# Patient Record
Sex: FEMALE | Race: WHITE | NOT HISPANIC OR LATINO | Employment: UNEMPLOYED | URBAN - METROPOLITAN AREA
[De-identification: names, ages, dates, MRNs, and addresses within clinical notes are randomized per-mention and may not be internally consistent; named-entity substitution may affect disease eponyms.]

---

## 2017-02-10 ENCOUNTER — HISTORICAL (OUTPATIENT)
Dept: ADMINISTRATIVE | Facility: HOSPITAL | Age: 37
End: 2017-02-10

## 2017-06-19 ENCOUNTER — HISTORICAL (OUTPATIENT)
Dept: ADMINISTRATIVE | Facility: HOSPITAL | Age: 37
End: 2017-06-19

## 2017-06-21 LAB
FINAL CULTURE: NO GROWTH
FINAL CULTURE: NORMAL

## 2017-06-22 LAB — FINAL CULTURE: NORMAL

## 2017-07-16 ENCOUNTER — HISTORICAL (OUTPATIENT)
Dept: ADMINISTRATIVE | Facility: HOSPITAL | Age: 37
End: 2017-07-16

## 2018-11-08 ENCOUNTER — HISTORICAL (OUTPATIENT)
Dept: ADMINISTRATIVE | Facility: HOSPITAL | Age: 38
End: 2018-11-08

## 2018-11-08 LAB
ABS NEUT (OLG): 6.19 X10(3)/MCL (ref 2.1–9.2)
AMPHET UR QL SCN: NORMAL
ANISOCYTOSIS BLD QL SMEAR: 1
BARBITURATE SCN PRESENT UR: NORMAL
BENZODIAZ UR QL SCN: NORMAL
CANNABINOIDS UR QL SCN: NORMAL
COCAINE UR QL SCN: NORMAL
EOSINOPHIL NFR BLD MANUAL: 1 % (ref 0–8)
ERYTHROCYTE [DISTWIDTH] IN BLOOD BY AUTOMATED COUNT: 12.7 % (ref 11.5–17)
GROUP & RH: NORMAL
HCT VFR BLD AUTO: 34.7 % (ref 37–47)
HGB BLD-MCNC: 12 GM/DL (ref 12–16)
LYMPHOCYTES NFR BLD MANUAL: 24 % (ref 13–40)
MCH RBC QN AUTO: 33.6 PG (ref 27–31)
MCHC RBC AUTO-ENTMCNC: 34.6 GM/DL (ref 33–36)
MCV RBC AUTO: 97.2 FL (ref 80–94)
MONOCYTES NFR BLD MANUAL: 7 % (ref 2–11)
NEUTROPHILS NFR BLD MANUAL: 68 % (ref 47–80)
OPIATES UR QL SCN: NORMAL
PCP UR QL: NORMAL
PH UR STRIP.AUTO: 7 [PH] (ref 5–7.5)
PLATELET # BLD AUTO: 155 X10(3)/MCL (ref 130–400)
PLATELET # BLD EST: NORMAL 10*3/UL
PMV BLD AUTO: 11.5 FL (ref 7.4–10.4)
RBC # BLD AUTO: 3.57 X10(6)/MCL (ref 4.2–5.4)
SP GR FLD REFRACTOMETRY: 1.02 (ref 1–1.03)
T PALLIDUM AB SER QL: NORMAL
WBC # SPEC AUTO: 9.9 X10(3)/MCL (ref 4.5–11.5)

## 2018-11-09 LAB
ABS NEUT (OLG): 8.77 X10(3)/MCL (ref 2.1–9.2)
BASOPHILS # BLD AUTO: 0 X10(3)/MCL (ref 0–0.2)
BASOPHILS NFR BLD AUTO: 0 %
BUN SERPL-MCNC: 6 MG/DL (ref 7–18)
CALCIUM SERPL-MCNC: 8.3 MG/DL (ref 8.5–10.1)
CHLORIDE SERPL-SCNC: 104 MMOL/L (ref 98–107)
CO2 SERPL-SCNC: 25 MMOL/L (ref 21–32)
CREAT SERPL-MCNC: 0.43 MG/DL (ref 0.55–1.02)
CREAT/UREA NIT SERPL: 14
EOSINOPHIL # BLD AUTO: 0.2 X10(3)/MCL (ref 0–0.9)
EOSINOPHIL NFR BLD AUTO: 1 %
ERYTHROCYTE [DISTWIDTH] IN BLOOD BY AUTOMATED COUNT: 12.8 % (ref 11.5–17)
GLUCOSE SERPL-MCNC: 78 MG/DL (ref 74–106)
HCT VFR BLD AUTO: 31.9 % (ref 37–47)
HGB BLD-MCNC: 10.8 GM/DL (ref 12–16)
LYMPHOCYTES # BLD AUTO: 2.4 X10(3)/MCL (ref 0.6–4.6)
LYMPHOCYTES NFR BLD AUTO: 20 %
MCH RBC QN AUTO: 34 PG (ref 27–31)
MCHC RBC AUTO-ENTMCNC: 33.9 GM/DL (ref 33–36)
MCV RBC AUTO: 100.3 FL (ref 80–94)
MONOCYTES # BLD AUTO: 0.9 X10(3)/MCL (ref 0.1–1.3)
MONOCYTES NFR BLD AUTO: 7 %
NEUTROPHILS # BLD AUTO: 8.77 X10(3)/MCL (ref 2.1–9.2)
NEUTROPHILS NFR BLD AUTO: 71 %
PLATELET # BLD AUTO: 141 X10(3)/MCL (ref 130–400)
PMV BLD AUTO: 11.5 FL (ref 9.4–12.4)
POTASSIUM SERPL-SCNC: 3.9 MMOL/L (ref 3.5–5.1)
RBC # BLD AUTO: 3.18 X10(6)/MCL (ref 4.2–5.4)
SODIUM SERPL-SCNC: 138 MMOL/L (ref 136–145)
WBC # SPEC AUTO: 12.4 X10(3)/MCL (ref 4.5–11.5)

## 2018-11-10 LAB — CA-I BLD-SCNC: 1.15 MMOL/L (ref 1.12–1.23)

## 2019-01-06 ENCOUNTER — HISTORICAL (OUTPATIENT)
Dept: ADMINISTRATIVE | Facility: HOSPITAL | Age: 39
End: 2019-01-06

## 2019-01-06 LAB
ABS NEUT (OLG): 7.6 X10(3)/MCL (ref 1.5–6.9)
ALBUMIN SERPL-MCNC: 3.6 GM/DL (ref 3.4–5)
ALBUMIN/GLOB SERPL: 1 RATIO
ALP SERPL-CCNC: 59 UNIT/L (ref 30–113)
ALT SERPL-CCNC: 17 UNIT/L (ref 10–45)
APPEARANCE, UA: ABNORMAL
AST SERPL-CCNC: 15 UNIT/L (ref 15–37)
BACTERIA SPEC CULT: ABNORMAL /HPF
BASOPHILS # BLD AUTO: 0 X10(3)/MCL (ref 0–0.1)
BASOPHILS NFR BLD AUTO: 0 % (ref 0–1)
BILIRUB SERPL-MCNC: 0.5 MG/DL (ref 0.1–0.9)
BILIRUB UR QL STRIP: NEGATIVE
BILIRUBIN DIRECT+TOT PNL SERPL-MCNC: 0.1 MG/DL (ref 0–0.3)
BILIRUBIN DIRECT+TOT PNL SERPL-MCNC: 0.4 MG/DL
BUN SERPL-MCNC: 11 MG/DL (ref 10–20)
CALCIUM SERPL-MCNC: 8.8 MG/DL (ref 8–10.5)
CHLORIDE SERPL-SCNC: 103 MMOL/L (ref 100–108)
CO2 SERPL-SCNC: 27 MMOL/L (ref 21–35)
COLOR UR: ABNORMAL
CREAT SERPL-MCNC: 0.81 MG/DL (ref 0.7–1.3)
EOSINOPHIL # BLD AUTO: 0.1 X10(3)/MCL (ref 0–0.6)
EOSINOPHIL NFR BLD AUTO: 1 % (ref 0–5)
ERYTHROCYTE [DISTWIDTH] IN BLOOD BY AUTOMATED COUNT: 10.8 % (ref 11.5–17)
GLOBULIN SER-MCNC: 3.6 GM/DL
GLUCOSE (UA): NEGATIVE
GLUCOSE SERPL-MCNC: 121 MG/DL (ref 75–116)
HCT VFR BLD AUTO: 40.1 % (ref 36–48)
HGB BLD-MCNC: 13.7 GM/DL (ref 12–16)
HGB UR QL STRIP: ABNORMAL
IMM GRANULOCYTES # BLD AUTO: 0.07 10*3/UL (ref 0–0.02)
IMM GRANULOCYTES NFR BLD AUTO: 0.7 % (ref 0–0.43)
KETONES UR QL STRIP: NEGATIVE
LEUKOCYTE ESTERASE UR QL STRIP: NEGATIVE
LYMPHOCYTES # BLD AUTO: 2.1 X10(3)/MCL (ref 0.5–4.1)
LYMPHOCYTES NFR BLD AUTO: 20 % (ref 15–40)
MCH RBC QN AUTO: 31 PG (ref 27–34)
MCHC RBC AUTO-ENTMCNC: 34 GM/DL (ref 31–36)
MCV RBC AUTO: 92 FL (ref 80–99)
MONOCYTES # BLD AUTO: 0.5 X10(3)/MCL (ref 0–1.1)
MONOCYTES NFR BLD AUTO: 4 % (ref 4–12)
NEUTROPHILS # BLD AUTO: 7.6 X10(3)/MCL (ref 1.5–6.9)
NEUTROPHILS NFR BLD AUTO: 73 % (ref 43–75)
NITRITE UR QL STRIP: NEGATIVE
PH UR STRIP: 6 [PH]
PLATELET # BLD AUTO: 198 X10(3)/MCL (ref 140–400)
PMV BLD AUTO: 11.3 FL (ref 6.8–10)
POTASSIUM SERPL-SCNC: 3.6 MMOL/L (ref 3.6–5.2)
PROT SERPL-MCNC: 7.2 GM/DL (ref 6.4–8.2)
PROT UR QL STRIP: NEGATIVE
RBC # BLD AUTO: 4.37 X10(6)/MCL (ref 4.2–5.4)
RBC #/AREA URNS HPF: ABNORMAL /HPF
SODIUM SERPL-SCNC: 139 MMOL/L (ref 135–145)
SP GR UR STRIP: 1.01
SQUAMOUS EPITHELIAL, UA: ABNORMAL /LPF
UROBILINOGEN UR STRIP-ACNC: 0.2 EU/DL
WBC # SPEC AUTO: 10.3 X10(3)/MCL (ref 4.5–11.5)
WBC #/AREA URNS HPF: ABNORMAL /HPF

## 2019-01-10 ENCOUNTER — HISTORICAL (OUTPATIENT)
Dept: ADMINISTRATIVE | Facility: HOSPITAL | Age: 39
End: 2019-01-10

## 2019-01-10 LAB
ABS NEUT (OLG): 4.2 X10(3)/MCL (ref 1.5–6.9)
ALBUMIN SERPL-MCNC: 3.6 GM/DL (ref 3.4–5)
ALBUMIN/GLOB SERPL: 0.9 RATIO
ALP SERPL-CCNC: 67 UNIT/L (ref 30–113)
ALT SERPL-CCNC: 12 UNIT/L (ref 10–45)
APTT PPP: 27.6 SECOND(S) (ref 25–35)
AST SERPL-CCNC: 16 UNIT/L (ref 15–37)
B-HCG SERPL QL: NEGATIVE
BILIRUB SERPL-MCNC: 0.4 MG/DL (ref 0.1–0.9)
BILIRUBIN DIRECT+TOT PNL SERPL-MCNC: 0.1 MG/DL (ref 0–0.3)
BILIRUBIN DIRECT+TOT PNL SERPL-MCNC: 0.3 MG/DL
BUN SERPL-MCNC: 11 MG/DL (ref 10–20)
CALCIUM SERPL-MCNC: 8.8 MG/DL (ref 8–10.5)
CHLORIDE SERPL-SCNC: 102 MMOL/L (ref 100–108)
CO2 SERPL-SCNC: 26 MMOL/L (ref 21–35)
CREAT SERPL-MCNC: 1.03 MG/DL (ref 0.7–1.3)
ERYTHROCYTE [DISTWIDTH] IN BLOOD BY AUTOMATED COUNT: 11 % (ref 11.5–17)
GLOBULIN SER-MCNC: 3.9 GM/DL
GLUCOSE SERPL-MCNC: 87 MG/DL (ref 75–116)
HCT VFR BLD AUTO: 37.1 % (ref 36–48)
HGB BLD-MCNC: 13 GM/DL (ref 12–16)
INR PPP: 0.9 (ref 0–1.2)
MCH RBC QN AUTO: 31 PG (ref 27–34)
MCHC RBC AUTO-ENTMCNC: 35 GM/DL (ref 31–36)
MCV RBC AUTO: 90 FL (ref 80–99)
PHOSPHATE SERPL-MCNC: 2.3 MG/DL (ref 2.6–4.7)
PLATELET # BLD AUTO: 214 X10(3)/MCL (ref 140–400)
PMV BLD AUTO: 11.2 FL (ref 6.8–10)
POTASSIUM SERPL-SCNC: 3.9 MMOL/L (ref 3.6–5.2)
PROT SERPL-MCNC: 7.5 GM/DL (ref 6.4–8.2)
PROTHROMBIN TIME: 9.7 SECOND(S) (ref 9–12)
RBC # BLD AUTO: 4.14 X10(6)/MCL (ref 4.2–5.4)
SODIUM SERPL-SCNC: 138 MMOL/L (ref 135–145)
URATE SERPL-MCNC: 2.8 MG/DL (ref 2.6–7.2)
WBC # SPEC AUTO: 7.3 X10(3)/MCL (ref 4.5–11.5)

## 2019-11-20 ENCOUNTER — HISTORICAL (OUTPATIENT)
Dept: ADMINISTRATIVE | Facility: HOSPITAL | Age: 39
End: 2019-11-20

## 2020-05-19 ENCOUNTER — HISTORICAL (OUTPATIENT)
Dept: ADMINISTRATIVE | Facility: HOSPITAL | Age: 40
End: 2020-05-19

## 2020-11-20 ENCOUNTER — HISTORICAL (OUTPATIENT)
Dept: ADMINISTRATIVE | Facility: HOSPITAL | Age: 40
End: 2020-11-20

## 2021-11-23 ENCOUNTER — HISTORICAL (OUTPATIENT)
Dept: ADMINISTRATIVE | Facility: HOSPITAL | Age: 41
End: 2021-11-23

## 2021-11-23 LAB
BUN SERPL-MCNC: 9 MG/DL (ref 7–18.7)
CALCIUM SERPL-MCNC: 9.3 MG/DL (ref 8.7–10.5)
CHLORIDE SERPL-SCNC: 106 MMOL/L (ref 98–107)
CO2 SERPL-SCNC: 26 MMOL/L (ref 22–29)
CREAT SERPL-MCNC: 0.76 MG/DL (ref 0.55–1.02)
CREAT/UREA NIT SERPL: 12
GLUCOSE SERPL-MCNC: 101 MG/DL (ref 74–100)
POTASSIUM SERPL-SCNC: 3.7 MMOL/L (ref 3.5–5.1)
SODIUM SERPL-SCNC: 140 MMOL/L (ref 136–145)

## 2022-04-07 ENCOUNTER — HISTORICAL (OUTPATIENT)
Dept: ADMINISTRATIVE | Facility: HOSPITAL | Age: 42
End: 2022-04-07

## 2022-04-18 ENCOUNTER — HISTORICAL (OUTPATIENT)
Dept: RADIOLOGY | Facility: HOSPITAL | Age: 42
End: 2022-04-18

## 2022-04-18 ENCOUNTER — HISTORICAL (OUTPATIENT)
Dept: ADMINISTRATIVE | Facility: HOSPITAL | Age: 42
End: 2022-04-18

## 2022-04-23 VITALS
WEIGHT: 131.38 LBS | SYSTOLIC BLOOD PRESSURE: 108 MMHG | DIASTOLIC BLOOD PRESSURE: 65 MMHG | HEIGHT: 62 IN | BODY MASS INDEX: 24.18 KG/M2

## 2022-04-30 NOTE — ED PROVIDER NOTES
Patient:   Alexa Garcia             MRN: [id number]            FIN: [id number]               Age:   36 years     Sex:  Female     :  1980   Associated Diagnoses:   None   Author:   Dwain Tesfaye MD      Addendum      Teaching-Supervisory Addendum-Brief   I participated in the following activities of this patients care: the medical history, the physical exam, medical decision making.   I personally performed: supervision of the patient's care, the medical history, the physical exam, the medical decision making.   The case was discussed with: the nurse practitioner.   Evaluation and management service: I agree with the evaluation and management decisions made in this patient's care.   Results interpretation: I agree with the study interpretation in this patient's care.   Notes: Pt seen and examined.  Pt presents with suspected rupture of membranes.  She is only at 17 weeks gestation.  Denies pain or bleeding.  Sent to ED by her primary OB - has been awaiting referral to Dr. Juarez for HROB care.  Will discuss with OB on-call regarding recommendations..

## 2022-04-30 NOTE — ED PROVIDER NOTES
"   Patient:   Alexa Garcia             MRN: [id number]            FIN: [id number]               Age:   36 years     Sex:  Female     :  1980   Associated Diagnoses:   Premature rupture of membranes in second trimester   Author:   Anastasiya Jarrett      Basic Information   Time seen: Date & time 2017 15:23:00.   History source: Patient.   Arrival mode: Private vehicle.   History limitation: None.   Additional information: Chief Complaint from Nursing Triage Note : Chief Complaint   2017 15:20 CDT      Chief Complaint           Pt c/o vaginal discharge, bilateral lower abd cramping, and midline back pain starting at 1130 this AM. 17 weeks gestation. Angelo Juarez. . Clear, watery vaginal discharge.  .      History of Present Illness   The patient presents with 37 YO F who is estimated 17 weeks pregnant presents with CC of "my water broke and I"m having low back pain."  [facility] high , I, Anastasiya Martines NP, assumed care of patient. and 36-year-old female  at 17 weeks gestation presents to ER with clear fluid leaking from her vagina.  She states last week she was seen by Dr. Sanchez prescribe MetroGel for a bacterial infection.  At that time she had a good fetal heart tones and cervix was closed.  She was not having any issues.  Today when she began having low back pain and abdominal cramping she again saw Dr. Sanchez who spoke with Dr. Juarez and patient was referred to the emergency room.  Patient denies any vaginal bleeding, fever or nausea/vomiting..  The onset was 4  hours ago.  The course/duration of symptoms is constant.  The location is pelvis.  The character of symptoms is crampy.  The degree at onset was moderate.  The degree at present is moderate.  Radiating pain: lower back. Pregnancy Status : 2, Para: 1 17 weeks.  The exacerbating factor is none.  The relieving factor is none.  Risk factors consist of none.  Prior episodes: none.  Therapy today: " none.  Pregnancy symptoms vaginal discharge.  Associated symptoms: none.  Additional history: none.        Review of Systems   Constitutional symptoms:  No fever,    Respiratory symptoms:  No shortness of breath,    Cardiovascular symptoms:  No chest pain,    Gastrointestinal symptoms:  Abdominal pain, mild, pelvic, cramping, no nausea, no vomiting.    Genitourinary symptoms:  Vaginal discharge.   Musculoskeletal symptoms:  Back pain.             Additional review of systems information: All other systems reviewed and otherwise negative.      Health Status   Allergies:    Allergic Reactions (Selected)  Severity Not Documented  Codeine- No reactions were documented..   Medications:  (Selected)   Prescriptions  Prescribed  Seroquel 100 mg oral tablet: 200 mg = 2 tab(s), Oral, At Bedtime, # 60 tab(s), 2 Refill(s)  levothyroxine 150 mcg (0.15 mg) oral tablet: 150 mcg = 1 tab(s), Oral, Daily, # 30 tab(s), 2 Refill(s).   Pregnancy history: 17 weeks,  2, para 1.      Past Medical/ Family/ Social History   Medical history:    Resolved  Drug abuse and dependence ([id number]):  Resolved on 2016 at 35 years.  Tobacco user ([id number]):  Resolved on 2016 at 35 years..   Surgical history:    Thyroidectomy ([id number]).  Comments:  2016 22:22 - Wellington Yu RN  in .   Family history:    No family history items have been selected or recorded..   Social history: Alcohol use: Denies, Tobacco use: Denies, Drug use: Denies.      Physical Examination               Vital Signs             Time:  2017 16:21:00.   Vital Signs   2017 15:20 CDT      Temperature Oral          37 DegC                             Peripheral Pulse Rate     89 bpm                             Respiratory Rate          18 br/min                             SpO2                      99 %                             Oxygen Therapy            Room air                             Systolic Blood Pressure   126  mmHg                             Diastolic Blood Pressure  72 mmHg  .   General:  Alert, no acute distress.    Skin:  Warm, dry.    Head:  Normocephalic.   Neck:  Trachea midline.   Cardiovascular:  Regular rate and rhythm, Normal peripheral perfusion.    Respiratory:  Lungs are clear to auscultation, respirations are non-labored.    Gastrointestinal:  Soft, Nontender.    Genitourinary:  External genitalia: Normal, Speculum exam: Moderate, discharge clear, Cervix closed, no tenderness.    Back:  Normal range of motion.   Musculoskeletal:  Normal ROM.   Neurological:  Alert and oriented to person, place, time, and situation.   Psychiatric:  Cooperative.      Medical Decision Making   Differential Diagnosis:  Threatened , Ruptured membraines.    Documents reviewed:  Emergency department nurses' notes.   Orders  Launch Orders   Laboratory:  Type and rh (Order): Stat collect, 2017 15:24 CDT, Blood, Lab Collect, Print Label By Order Location, 2017 15:24 CDT  CMP (Order): Stat collect, 2017 15:24 CDT, Blood, Lab Collect, Print Label By Order Location, 2017 15:24 CDT  Urinalysis Complete a reflex to culture (Order): Stat collect, Urine, 2017 15:24 CDT, Nurse collect, Print Label By Order Location, 2017 15:24 CDT  CBC w/ Auto Diff (Order): Now collect, 2017 15:24 CDT, Blood, Lab Collect, Print Label By Order Location, 2017 15:24 CDT  Radiology:  US OB 2nd or 3rd Trimester (Order): Stat, 2017 15:24 CDT, Abdominal Pain, None, Ambulatory, Rad Type, Schedule this test, [facility], Launch Orders   Admit/Transfer/Discharge:  Admit as Inpatient (Order): 2017 18:46 CDT, Darcy JON, Iain GOMEZ [facility], Premature rupture of membranes in second trimester, No.    Results review:  Lab results : Lab View   2017 17:09 CDT      UA Appear                 TURBID                             UA Color                  YELLOW                             UA Spec Grav               1.016                             UA Bili                   Negative                             UA pH                     6.5                             UA Urobilinogen           1.0                             UA Blood                  3+                             UA Glucose                Negative                             UA Ketones                Negative                             UA Protein                1+                             UA Nitrite                Negative                             UA Leuk Est               3+    6/19/2017 16:06 CDT      Sodium Lvl                140 mmol/L                             Potassium Lvl             3.8 mmol/L                             Chloride                  106 mmol/L                             CO2                       25.0 mmol/L                             Calcium Lvl               8.9 mg/dL                             Glucose Lvl               79 mg/dL                             BUN                       5.0 mg/dL  LOW                             Creatinine                0.40 mg/dL  LOW                             eGFR-AA                   >60 mL/min/1.73 m2  NA                             eGFR-NIC                  >60 mL/min/1.73 m2  NA                             Bili Total                0.3 mg/dL                             Bili Direct               0.10 mg/dL                             Bili Indirect             0.20 mg/dL                             AST                       10 unit/L  LOW                             ALT                       15 unit/L                             Alk Phos                  104 unit/L                             Total Protein             7.2 gm/dL                             Albumin Lvl               2.80 gm/dL  LOW                             Globulin                  4.40 gm/dL  HI                             A/G Ratio                 0.6 ratio  LOW                             WBC                        12.8 x10(3)/mcL  HI                             RBC                       3.88 x10(6)/mcL  LOW                             Hgb                       12.7 gm/dL                             Hct                       35.8 %  LOW                             Platelet                  159 x10(3)/mcL                             MCV                       92.3 fL                             MCH                       32.7 pg  HI                             MCHC                      35.5 gm/dL                             RDW                       13.0 %                             MPV                       10.9 fL                             Abs Neut                  10.11 x10(3)/mcL  HI                             Neutro Auto               79 %  NA                             Lymph Auto                15 %  NA                             Mono Auto                 5 %  NA                             Eos Auto                  1 %  NA                             Abs Eos                   0.1 x10(3)/mcL                             Basophil Auto             0 %  NA                             Abs Neutro                10.11 x10(3)/mcL  HI                             Abs Lymph                 1.9 x10(3)/mcL                             Abs Mono                  0.6 x10(3)/mcL                             Abs Baso                  0.0 x10(3)/mcL                             ABO/Rh                    O POS  .   Radiology results:  Ultrasound, OB 2nd trimester, interpretation:  Estimated date of delivery is 11/27/17.  Cervix measures 3.73 cm.  Closed.  Fetal heart rate of 154 bpm.  Placenta anterior/frontal appears normal.  SDP of fluid measures 2.82 cm.  Good fetal movement.       Reexamination/ Reevaluation   Time: 6/19/2017 17:54:00 .   Vital signs   results included from flowsheet : Vital Signs   6/19/2017 15:20 CDT      Temperature Oral          37 DegC                             Peripheral Pulse Rate     89 bpm                              Respiratory Rate          18 br/min                             SpO2                      99 %                             Oxygen Therapy            Room air                             Systolic Blood Pressure   126 mmHg                             Diastolic Blood Pressure  72 mmHg     Course: improving.   Assessment: informed patient of US findings and that Dr. Taveras will see patient in Er. .      Impression and Plan   Diagnosis   Premature rupture of membranes in second trimester ([id number] O42.912)      Calls-Consults   -  6/19/2017 17:49:00 , Darcy JON, Iain GOMEZ, OB, phone call, consult, recommends will see patietn in ER.    Plan   Condition: Stable.    Disposition: Admit time  6/19/2017 18:47:00, Admit to Inpatient Unit.    Counseled: Patient, Family, Regarding diagnosis, Regarding treatment plan.    Notes: Care discussed with TOM Tesfaye M.D. who independently examine the patient and agrees with plan of care..

## 2022-04-30 NOTE — DISCHARGE SUMMARY
DISCHARGE DATE:      This patient was admitted Monday night through the Emergency Room.  She was at 17 weeks gestation with spontaneously rupture membranes. She was admitted for evaluation and observation.  I consulted Dr. Scott Juarez, high risk perinatologist for input.  Since admission, the patient has remained afebrile.  Fetus continued to have a heart rate in the 140's.  Initial white count at admission was 12,700, but is now decreased to 10,000.  Again, she has been afebrile.  She continues to leak some fluid, but it has decreased in amount.  She has had some light bleeding on the peripad.  A repeat ultrasound today showed severe oligohydramnios consistent with spontaneously ruptured membranes.  I discussed all the findings with the patient.  She has been on Ampicillin since admission.  The patient also has a history of drug abuse and is on methadone maintenance therapy.  She also has a history of hypothyroidism.  The plan today is to discharge the patient to home and start bedrest.  She will be sent home on Synthroid and Suboxone maintenance therapy.  I will follow her back in the office on Monday.  She was instructed to call or return to the hospital with any increased amount of vaginal bleeding or cramping or any signs of distress.    DISCHARGE DIAGNOSIS:  Premature rupture membranes at 17 weeks and 3 days with a history of drug dependence during pregnancy, hypothyroidism.        ______________________________  Iain Taveras MD DRB/CHELY  DD:  06/22/2017  Time:  01:54PM  DT:  06/22/2017  Time:  02:55PM  Job #:  [id number]

## 2022-04-30 NOTE — DISCHARGE SUMMARY
Patient:   Alexa Garcia            MRN: [id number]            FIN: [id number]               Age:   38 years     Sex:  Female     :  1980   Associated Diagnoses:   None   Author:   Heidi Buchanan NP      Basic Information   Admit information:     Today's Information:  39y/o  with pregnancy complicated by AMA, secondary hypothyroidims on synthroid and subutex dependency, currently s/p repeat LSTCS with BTL and no complaints. She is ambulating, voiding, tolerating diet. + flatus   .       Review of Systems   All other systems are negative      Health Status   Allergies:    Allergic Reactions (All)  Severity Not Documented  Codeine- No reactions were documented.      Physical Examination      Vital Signs (last 24 hrs)_____  Last Charted___________  Temp Oral     36.6 DegC  (NOV 10 07:)  Heart Rate Peripheral   86 bpm  (NOV 10 07:)  Resp Rate         18 br/min  (NOV 10 07:08)  SBP      107 mmHg  (NOV 10 07:)  DBP      61 mmHg  (NOV 10 07:08)  SpO2      98 %  (NOV 10 00:)   General:  Alert and oriented, No acute distress.    Eye:  Pupils are equal, round and reactive to light.    Respiratory:  Lungs are clear to auscultation, Respirations are non-labored.    Cardiovascular:  Normal rate, Regular rhythm, No edema.    Breast:  No mass, No tenderness, Not engorged.    Gastrointestinal:  Soft, Fundus wnl, appropriately tender, incision - clean, dry, intact with edges approximated with staples.    Genitourinary:  Lochia wnl.    Musculoskeletal:  BLE wnl .    Integumentary:  Warm, Dry, Pink.    Psychiatric:  Appropriate mood & affect.       Review / Management   Results review:     Labs (Last four charted values)  WBC                  H 12.4 () 9.9 ()   Hgb                  L 10.8 () 12.0 ()   Hct                  L 31.9 () L 34.7 ()   Plt                  141 () 155 ()   Na                   138 ()   K                    3.9 (NOV  09)   CO2                  25.0 (NOV 09)   Cl                   104 (NOV 09)   Cr                   L 0.43 (NOV 09)   BUN                  L 6.0 (NOV 09)   Glucose Random       78 (NOV 09) , All Results: 11/10/2018 5:34 CST      Calcium Ionized           1.15 mmol/L  .    Laboratory Results        Reviewed labs: Normal results   Condition:  Stable.       Impression and Plan   Course:  Progressing as expected.    S/P repeat LSTCS with BTL POD#2  stable. encouraging po fluid intake, ambulation and adequate nutrition. Pelvic rest x 6 weeks. Activity ad kathy. If remain stable and no complaints, will discharge in am. follow up in office next week for postop. Continue current POC with expectant management to be done.   - hypothyroidism, on synthroid. Continue current synthroid 200mcg and close followup with PCP post delivery.

## 2022-04-30 NOTE — ED PROVIDER NOTES
Patient:   Alexa Garcia            MRN: [id number]            FIN: [id number]               Age:   38 years     Sex:  Female     :  1980   Associated Diagnoses:   Right ureteral stone   Author:   Bernabe Lewis MD      Basic Information   Time seen: Date & time 2019 06:33:00.   History source: Patient.   Arrival mode: Ambulance.   History limitation: None.   Additional information: Chief Complaint from Nursing Triage Note : Chief Complaint   2019 6:19 CST        Chief Complaint           Right sided back pain x 3 hours.  EMS gave 100 MCG Fentanyl and 4 mg Zofran.  .      History of Present Illness   38-year-old white female, with spontaneous onset of right flank pain 3 hours ago. She denies trauma to her back. She called EMS to pick her up. She has been given fentanyl 100 mg. She is on Suboxone for an old history of narcotic abuse. She denies  symptoms. She is currently on her menses.      Review of Systems   Constitutional symptoms:  Negative except as documented in HPI.      Health Status   Allergies:    Allergic Reactions (Selected)  Severity Not Documented  Codeine- No reactions were documented..      Past Medical/ Family/ Social History   Medical history:    Active  Hypothyroidism ([id number]): Onset in  at 34 years.  Resolved  Pregnant ([id number]): Onset on 10/15/2018 at 38 years.  Resolved on 2018 at 38 years.  Premature labor ([id number]): Onset on 2017 at 36 years.  Resolved on 2017 at 36 years.  Comments:  2017 CDT 9:31 CDT - SYSTEM  Problem added by Discern Expert   premature rupture of membranes ([id number]): Onset on 2017 at 36 years.  Resolved on 2017 at 36 years.  Comments:  2017 CDT 19:56 CDT - SYSTEM  Problem added by Discern Expert  Pregnant ([id number]): Onset on 2017 at 36 years.  Resolved on 2017 at 36 years.  Pregnant ([id number]): Onset on 2007 at 26 years.  Resolved in  at 27  years.  Drug abuse and dependence ([id number]):  Resolved on 2016 at 35 years.  Tobacco user ([id number]):  Resolved on 2016 at 35 years..   Surgical history:     delivery only; (83060) on 2018 at 38 Years.   w/ Tubal Ligation (None) on 2018 at 38 Years.  Comments:  2018 10:26 - Sunni Fabian RN  auto-populated from documented surgical case  Dilation & Curettage on 2017 at 36 Years.  Comments:  2017 23:01 - Genevieve Russell RN  auto-populated from documented surgical case  Caesarean section ([id number]) in  at 28 Years.  Tonsillectomy ([id number]) in  at 21 Years.  Thyroidectomy (51049186).  Comments:  2016 22:22 - Wellington Yu RN  in .      Physical Examination               Vital Signs   Vital Signs   2019 6:19 CST        Temperature Oral          36.8 DegC                             Temperature Oral (calculated)             98.24 DegF                             Peripheral Pulse Rate     62 bpm                             Respiratory Rate          24 br/min                             SpO2                      98 %                             Systolic Blood Pressure   160 mmHg  HI                             Diastolic Blood Pressure  80 mmHg  .   Measurements   2019 6:19 CST        Weight Dosing             82 kg                             Weight Measured and Calculated in Lbs     180.78 lb                             Weight Estimated          82 kg                             Height/Length Dosing      164 cm                             Height/Length Estimated   164 cm                             Body Mass Index Estimated 30.49 kg/m2  .   Basic Oxygen Information   2019 6:19 CST        SpO2                      98 %  .   General:  Alert, moderate distress.    Skin:  Warm, dry.    Neck:  Supple.   Cardiovascular:  Regular rate and rhythm, No murmur.    Respiratory:  Lungs are clear to auscultation.    Gastrointestinal:  Normal bowel sounds, Tenderness: Moderate, right flank, Guarding: Negative, Rebound: Negative.    Back:  Nontender.   Neurological:  Alert and oriented to person, place, time, and situation.   Psychiatric:  Cooperative.      Medical Decision Making   Results review:  Lab results : Lab View   1/6/2019 6:53 CST        Sodium Lvl                139 mmol/L                             Potassium Lvl             3.6 mmol/L                             Chloride                  103 mmol/L                             CO2                       27 mmol/L                             Calcium Lvl               8.8 mg/dL                             Glucose Lvl               121 mg/dL  HI                             BUN                       11 mg/dL                             Creatinine                0.81 mg/dL                             eGFR-AA                   >60 mL/min/1.73 m2  NA                             eGFR-NIC                  >60 mL/min/1.73 m2  NA                             Bili Total                0.5 mg/dL                             Bili Direct               0.10 mg/dL                             Bili Indirect             0.40 mg/dL  NA                             AST                       15 unit/L                             ALT                       17 unit/L                             Alk Phos                  59 unit/L                             Total Protein             7.2 gm/dL                             Albumin Lvl               3.6 gm/dL                             Globulin                  3.60 gm/dL  NA                             A/G Ratio                 1.0 ratio  NA                             WBC                       10.3 x10(3)/mcL                             RBC                       4.37 x10(6)/mcL                             Hgb                       13.7 gm/dL                             Hct                       40.1 %                             Platelet                   198 x10(3)/mcL                             MCV                       92 fL                             MCH                       31 pg                             MCHC                      34 gm/dL                             RDW                       10.8 %  LOW                             MPV                       11.3 fL  HI                             Abs Neut                  7.6 x10(3)/mcL  HI                             Neutro Auto               73 %                             Lymph Auto                20 %                             Mono Auto                 4 %                             Eos Auto                  1 %                             Abs Eos                   0.1 x10(3)/mcL                             Basophil Auto             0 %                             Abs Neutro                7.6 x10(3)/mcL  HI                             Abs Lymph                 2.1 x10(3)/mcL                             Abs Mono                  0.5 x10(3)/mcL                             Abs Baso                  0.0 x10(3)/mcL                             IG%                       0.700 %  HI                             IG#                       0.0700  HI  .   Radiology results:  01/6/2019 8:41; Debbie JON, Bernabe GARZON; Positive; There is a proximal ureteral stone measuring 4 mm, on the right side. There is mild hydronephrosis. There are some nonobstructing stones in the lower pole of the left kidney..      Reexamination/ Reevaluation   Time: 1/6/2019 08:58:00 .   Course: improving.   Pain status: decreased.      Impression and Plan   Diagnosis   Right ureteral stone ([id number] N20.1)   Plan   Disposition: Discharged: Time  1/6/2019 08:59:00, to home.    Prescriptions: Launch prescriptions   Pharmacy:  [facility] 7.5 mg-325 mg oral tablet (Prescribe): 1 tab(s), Oral, q6hr, PRN PRN as needed for pain, X 2 day(s), # 7 tab(s), 0 Refill(s).    Patient was given the following educational materials: Kidney Stones,  Easy-to-Read, Kidney Stones, Easy-to-Read.    Limitations: Limited activity.    Follow up with: Kavon Simmons; Follow-up with PCP in 2-3 days, for repeat urinalysis..    Counseled: Patient.    Orders: [facility] Orders   Admit/Transfer/Discharge:  Discharge (Order): 1/6/2019 9:03 CST, Home.     Melatonin 3 mg prn nightly  Trazadone 50 mg prn nightly Melatonin 3 mg prn nightly  Trazadone 100 mg prn nightly

## 2022-04-30 NOTE — H&P
HISTORY:  This patient is a 36-year-old,  2, para 1.  She stated the last period was on 2017.  She is currently at 17 weeks gestation.  The patient presented to her doctor,  __________ in [place], this morning complaining of leaking of fluid which began at around 11 o'clock this morning.  She stated that she had 2 gushes of fluid through her panties onto her legs.  She presented to her doctor where an examination was performed.  He had no clue as to whether or not she was ruptured.  He ordered an ultrasound and told her to report back if she had any more problems.  The patient presented to the emergency room at [facility] at approximately 3:00 PM.  At that time, she was seen, she was examined by the nurse practitioner with speculum exam confirmed that she had gross rupture of membranes with no bleeding identified.  An ultrasound was performed here in the emergency room which showed a fetus measuring 16 1/2 weeks with positive fetal heart tones and movement; however, a decreased amount of amniotic fluid identified measuring 2.8 cm.  The photographs were viewed by me.  It does appear as though the cervix may be partially starting to dilate.  The patient is also complaining of some contractions which are becoming slightly more intense.    PAST MEDICAL HISTORY:  She has a history of a seizure disorder but is not on any anticonvulsants at this time.  She does have a history of methadone usage for previous drug addiction.  She is currently on Subutex 4 mg daily.  She also has a history of thyroid abnormality and is currently on Synthroid 150 mcg daily.  The patient has had 1 previous  section.  Her baby is 9 years old.  She is currently employed at [employer] in [place].    PHYSICAL EXAMINATION:  VITAL SIGNS:  She was afebrile.  Her other vital signs were stable.   HEENT:  Exam was negative.   HEART AND LUNGS:  Clear.   ABDOMEN:  She had a fundal height of approximately  14 weeks in size.  The abdomen was otherwise nontender.  She had no CVA tenderness.     GENITOURINARY:  The external genitalia were normal.  A speculum exam of the vagina revealed a large amount of yellowish hazy fluid in the vagina.  This fluid was collected.  It was also cultured for beta strep and I collected the fluid and sent it to the lab for culture.  A cervical exam was not performed.  I reviewed the ultrasound films.  Again, it was a single IUP measuring 16 weeks and 2 days with the largest pocket of fluid measuring 2.82 cm.    LABORATORY DATA:  Her white count was elevated at 00665, her hemoglobin was 12.7, hematocrit 35.8, she had an absolute neutrophil increase of 10.11%.  Her electrolytes were within normal limits.  Her urinalysis; however, showed turbid fluid.  She had 3+ blood, 1+ protein.  She had 3+ leukocyte esterase with a large amount of white cells in the urine and she is A positive.    ADMITTING DIAGNOSIS:  Intrauterine pregnancy at 16 weeks and 2 days with premature rupture of membranes, probable early chorioamnionitis.    PLAN:  Admit the patient at this time for IV hydration and further observation.  I am going to consult Dr. Juarez in the morning.  I discussed the findings with the patient.  I explained to her that premature rupture of membranes at this stage of her pregnancy is a high-risk situation, that she would more than likely lose this pregnancy, if any infection or issues for the mom dictated, we would have to proceed with Cytotec induction in order to help facilitate delivery.  Otherwise, we will keep the patient in the hospital with close observation and supportive care.        ______________________________  Iain Taveras MD DRB/ELIU  DD:  06/19/2017  Time:  07:05PM  DT:  06/19/2017  Time:  07:37PM  Job #:  [id number]    The H&P was reviewed, the patient was examined, and the following changes to the patients condition are  noted:  ______________________________________________________________________________  ______________________________________________________________________________  ______________________________________________________________________________  [  ] No changes to the patient's condition:      ______________________________                                             ___________________  PHYSICIAN SIGNATURE                                                             DATE/TIME

## 2022-04-30 NOTE — CONSULTS
DATE OF CONSULTATION:      ATTENDING PHYSICIAN:  Iain Taveras MD  CONSULTING PHYSICIAN:  Scott Juarez MD    SUBJECTIVE:  Patient is a 36-year-old female Para 1-0-0-1 currently at 17 weeks and 1 days gestation by LMP consistent with earlier ultrasound.  The patient has been followed by Dr. Dash in [place] and reported leaking fluid for which because the hospital there is not working, she was advised to come to [facility] Emergency Room.  The patient was confirmed to have ruptured membranes and it was elected to put her on ampicillin and is expectantly managed.    PAST MEDICAL HISTORY:  The patient's past medical history significant for history of narcotic dependence for which she is currently taking Subutex 4 mg daily.  She also has history of hypothyroidism for which she is taking 150 mcg of Synthroid.  She had 1 previous  section in the past.  The baby is about 9 years old.  She works at [employer] in [place].  She has no other chronic medical illnesses.  The patient had also past history of seizures, but not on any medication at this time and has been seizure-free for years.    FAMILY HISTORY:  Reviewed as mentioned in the nurse note.    PHYSICAL EXAMINATION:  The patient had bleeding this morning.  She reports no respiratory symptoms.  No GI symptoms.  No urinary complaints.  No other complaints.    LABORATORY DATA:  I ordered a TSH and it was 53.9.  Her hemoglobin and hematocrit were 12.7 and 35.8 with platelets of 159 and metabolic panel was negative.     Ultrasound showed oligohydramnios.  A speculum examination by Dr. Taveras showed the cervix to be essentially closed.    IMPRESSION:    1. A 17 weeks and 1 day gestation with premature rupture of membranes, second trimester.  I discussed with her the guarded prognosis in this situation with high __________ morbidity, mortality risks.  I explained to her that the concerns are high risk of infection that could develop  or labor could occur, placental abruption could occur, cord prolapse with fetal demise in utero, all of which would necessitate immediate delivery.  Also discussed with them the option of inducing labor to prevent infection and in view of the very guarded prognosis and very high likelihood of not reaching viability and patient is aware of those options and wants to do expectant management.  She has some bleeding this morning that will be expectantly managed.  If the bleeding gets heavy, then she needs to be delivered.  I explained to her that unfortunately, even if the pregnancy continues beyond 24 weeks gestation, there is significant risk of lung hypoplasia that could lead to significant morbidity and mortality risk as well as the risk of contracture syndrome because of the oligohydramnios and fixed positions the baby will be in in utero.  Outpatient treatment is a reasonable option after initial observation time, especially if the bleeding stops.  Patient to check her temperature 3 times a day.  Avoid any pelvic activity and we could see her as an outpatient.  I will be happy to follow here with followup next week in the office to do a level 2 scan if she is still pregnant.  I explained to her that unfortunately there is no intervention that is going to improve outcomes at this time.  I explained to her that antibiotic to prolong the latent phase beyond 24 weeks has limited role prior to 24 weeks because of the limited benefit although it is reasonable to continue to give ampicillin and continue it for a week if it is given.    2. Advanced maternal age.  The increased risk of aneuploidy with advanced maternal age discussed related to __________ dysfunction, diagnostic and screening tests were explained.  We will readdress that in the office next week.  3. Hypothyroidism that is very poorly controlled.  Will double check if she is compliant with the treatment and if she is, then she needs significant adjustment of  dose of medication.  If she is noncompliant, we will do some adjustment and urge the compliance.  The grave risks from hypothyroidism uncontrolled on the fetal development and pregnancy outcomes was emphasized to the patient.  4. Vaginal bleeding that could progress into heavy bleeding that requires delivery.  I told her there is no treatment that could help and expectant management to be done.  Her blood type is O positive.  No RhoGAM is needed.  We will have a urinalysis that was abnormal yesterday with many WBCs.  We will order a culture and treat if positive.     Thanks for the consultation.  I will be happy to see her again as needed.        ______________________________  MD ANGIE Mcdonald/ELIU  DD:  06/20/2017  Time:  06:10PM  DT:  06/20/2017  Time:  07:11PM  Job #:  [id number]

## 2022-04-30 NOTE — OP NOTE
DATE OF SURGERY:        SURGEON:  Scott Juarez MD  ASSISTANT:  Kalpesh Germain DO    PREOPERATIVE DIAGNOSIS:  39 weeks gestation with previous  section, undesired fertility, advanced maternal age.    POSTOPERATIVE DIAGNOSIS:  39 weeks gestation with previous  section, undesired fertility, advanced maternal age; delivered.    OPERATION:  Repeat low-segment transverse  section and bilateral tubal ligation surgeon.    ANESTHESIA:  Spinal.    ANESTHESIA:  Jorgito Norris MD    COMPLICATIONS:  None.    ESTIMATED BLOOD LOSS:  500 mL of blood.    PROCEDURE IN DETAIL:  The patient was placed on the operating room table in the supine position with a wedge placed under the right side.  After spinal anesthesia was started by Dr. Norris, a Najera catheter was inserted into the bladder under aseptic conditions and the abdomen was prepped and draped in a sterile manner.  After achieving an adequate level of anesthesia, the patient received 2 g of Ancef prophylactically; SCD for DVT prophylaxis.  A supraumbilical transverse skin incision was made incising through the old scar and carried down through the layers of the abdomen into the peritoneal cavity.  The lower uterine segment was well developed.  The bladder flap was developed using sharp dissection.  An incision was made transversely in the lower uterine segment, scoring through the uterine wall, entering centrally and extending the incision laterally bluntly.  The fetus was delivered from a vertex presentation and handed to the  nurses attending the delivery.  Birth weight 8 pounds 3 ounces, Apgar scores of 8 and 9 at one and five minutes respectively.  The placenta was expressed out.  The uterus contracted well and uterine incision was closed in 2 layers, with the first layer including the endometrium and myometrium and the second layer imbricating the myometrium with #1 chromic sutures.  Additional sutures were used to obtain  complete hemostasis.     Attention was turned to the right fallopian tube that was visualized completely to the fimbriated end.  The isthmic portion of the tube was grasped with the Jacqueline instrument and the underlying mesosalpinx was opened with Bovie cautery.  The intervening portion of the tube was clamped, cut and doubly tied using #1 chromic suture.  A similar procedure was performed on the other side with similar results.  There was adequate hemostasis.  Lap, sponge, instrument and needle counts were reported as correct.  The peritoneal cavity was cleared of blood clots and the abdomen was closed in layers as follows.  The peritoneum was closed with continuous #0 Vicryl suture.  The fascia was closed in 2 parts using continuous #1 Vicryl suture.  The subcutaneous tissue was copiously irrigated with normal saline and hemostasis was secured with Bovie cautery and approximated using 2-0 Vicryl suture.  The skin was closed using staples.  The patient tolerated the procedure well and was transferred to the recovery room in stable condition.        ______________________________  Scott Juarez MD    PKD/UD  DD:  11/08/2018  Time:  10:12AM  DT:  11/08/2018  Time:  10:44AM  Job #:  [id number]

## 2022-04-30 NOTE — OP NOTE
PREOPERATIVE DIAGNOSIS:  Right ureteral calculus, left renal calculus, right hydronephrosis.    POSTOPERATIVE DIAGNOSIS:  Right ureteral calculus, left renal calculus, right hydronephrosis.  No evidence of right ureteral calculus; she apparently passed it.    ANESTHESIA:  IV sedation and local.    PROCEDURE PERFORMED:  Right ureteroscopy.    DESCRIPTION OF PROCEDURE:  After operative consent, patient brought to the operating room and placed on the table in supine position.  IV sedation induced.  Patient converted to dorsal lithotomy position.  Genital area prepped and draped in usual sterile fashion.  After adequate local anesthesia with 2% plain lidocaine jelly intraurethrally, the 22-Macedonian cystoscope was advanced per urethra into the bladder.  Urethra free of lesions.  Bladder was smooth.  No foreign bodies or tumors identified.  Ureteral orifice was normal size, shape and position.  At this time, a right retrograde pyelogram performed, which revealed some holdup in the distal right ureter.  No definite stone was seen.  The catheter could not be advanced beyond this point.  Guidewire was then guided up without any difficulty into the renal pelvis.  The cystoscope was removed.  The 9-Macedonian rigid ureteroscope was advanced per urethra into the bladder and up the ureter.  She had a lot of inflammation in the distal ureter; however, we passed this with the scope and advanced all the way into the renal pelvis.  No stones were ever seen.  Likewise on visualization upon removal of the scope, there was no evidence of any stones and no evidence of any ureteral injury.  At this time, the cystoscope was reinserted into the bladder and right retrograde pyelogram performed, which revealed no extravasation or free flow contrast.  Catheter could easily be advanced all the way up the ureter.  At this time, the bladder was drained.  Cystoscope was removed, and bimanual examination performed, which revealed a mild      cystocele.  Cervix was intact.  Rectal examination was unremarkable.  The patient returned to her room in good condition.  Plan will be to follow up in the office in 1 week with a urinalysis.        RIZWANA/ESTELLE   DD: 01/11/2019 0737   DT: 01/11/2019 0754  Job # [id number]    cc: Dr. Simmons

## 2022-05-17 LAB
PAP RECOMMENDATION EXT: NORMAL
PAP SMEAR: NORMAL

## 2022-11-28 ENCOUNTER — HOSPITAL ENCOUNTER (OUTPATIENT)
Dept: RADIOLOGY | Facility: HOSPITAL | Age: 42
Discharge: HOME | End: 2022-11-28
Attending: UROLOGY
Payer: MEDICAID

## 2022-11-28 DIAGNOSIS — N20.0 CALCULUS OF KIDNEY: ICD-10-CM

## 2022-11-28 PROCEDURE — 74018 RADEX ABDOMEN 1 VIEW: CPT | Mod: TC

## 2023-02-01 ENCOUNTER — DOCUMENTATION ONLY (OUTPATIENT)
Dept: ADMINISTRATIVE | Facility: HOSPITAL | Age: 43
End: 2023-02-01
Payer: MEDICAID

## 2023-05-23 ENCOUNTER — OFFICE VISIT (OUTPATIENT)
Dept: OBSTETRICS AND GYNECOLOGY | Facility: CLINIC | Age: 43
End: 2023-05-23
Payer: MEDICAID

## 2023-05-23 VITALS
SYSTOLIC BLOOD PRESSURE: 128 MMHG | HEIGHT: 61 IN | WEIGHT: 139.56 LBS | DIASTOLIC BLOOD PRESSURE: 64 MMHG | BODY MASS INDEX: 26.35 KG/M2 | HEART RATE: 80 BPM

## 2023-05-23 DIAGNOSIS — Z01.419 ROUTINE GYNECOLOGICAL EXAMINATION: Primary | ICD-10-CM

## 2023-05-23 PROCEDURE — 1159F MED LIST DOCD IN RCRD: CPT | Mod: CPTII,,, | Performed by: NURSE PRACTITIONER

## 2023-05-23 PROCEDURE — 99396 PR PREVENTIVE VISIT,EST,40-64: ICD-10-PCS | Mod: ,,, | Performed by: NURSE PRACTITIONER

## 2023-05-23 PROCEDURE — 3078F PR MOST RECENT DIASTOLIC BLOOD PRESSURE < 80 MM HG: ICD-10-PCS | Mod: CPTII,,, | Performed by: NURSE PRACTITIONER

## 2023-05-23 PROCEDURE — 1160F PR REVIEW ALL MEDS BY PRESCRIBER/CLIN PHARMACIST DOCUMENTED: ICD-10-PCS | Mod: CPTII,,, | Performed by: NURSE PRACTITIONER

## 2023-05-23 PROCEDURE — 1159F PR MEDICATION LIST DOCUMENTED IN MEDICAL RECORD: ICD-10-PCS | Mod: CPTII,,, | Performed by: NURSE PRACTITIONER

## 2023-05-23 PROCEDURE — 3078F DIAST BP <80 MM HG: CPT | Mod: CPTII,,, | Performed by: NURSE PRACTITIONER

## 2023-05-23 PROCEDURE — 1160F RVW MEDS BY RX/DR IN RCRD: CPT | Mod: CPTII,,, | Performed by: NURSE PRACTITIONER

## 2023-05-23 PROCEDURE — 3008F PR BODY MASS INDEX (BMI) DOCUMENTED: ICD-10-PCS | Mod: CPTII,,, | Performed by: NURSE PRACTITIONER

## 2023-05-23 PROCEDURE — 3008F BODY MASS INDEX DOCD: CPT | Mod: CPTII,,, | Performed by: NURSE PRACTITIONER

## 2023-05-23 PROCEDURE — 3074F PR MOST RECENT SYSTOLIC BLOOD PRESSURE < 130 MM HG: ICD-10-PCS | Mod: CPTII,,, | Performed by: NURSE PRACTITIONER

## 2023-05-23 PROCEDURE — 99396 PREV VISIT EST AGE 40-64: CPT | Mod: ,,, | Performed by: NURSE PRACTITIONER

## 2023-05-23 PROCEDURE — 3074F SYST BP LT 130 MM HG: CPT | Mod: CPTII,,, | Performed by: NURSE PRACTITIONER

## 2023-05-23 RX ORDER — ONDANSETRON 4 MG/1
TABLET, ORALLY DISINTEGRATING ORAL
COMMUNITY
Start: 2023-01-04

## 2023-05-23 RX ORDER — BUPRENORPHINE AND NALOXONE 8; 2 MG/1; MG/1
FILM, SOLUBLE BUCCAL; SUBLINGUAL
COMMUNITY

## 2023-05-23 RX ORDER — LISDEXAMFETAMINE DIMESYLATE 60 MG/1
60 CAPSULE ORAL
COMMUNITY
Start: 2022-05-17

## 2023-05-23 RX ORDER — LEVOTHYROXINE SODIUM 125 UG/1
125 TABLET ORAL
COMMUNITY
Start: 2023-04-06

## 2023-05-23 NOTE — PROGRESS NOTES
Patient ID: [id number]   Chief Complaint: Annual exam  Chief Complaint   Patient presents with    Annual Exam     PT PRESENTS TO CLINIC FOR ANNUAL EXAM, OTHERWISE NO COMPLAINTS.     HPI:   Alexa Garcia is a 42 y.o. year old  here for her Annual Exam. Patient's last menstrual period was 2023. She is doing well. Denies any health changes. Annual Exam (PT PRESENTS TO CLINIC FOR ANNUAL EXAM, OTHERWISE NO COMPLAINTS.)    Subjective:     Past Medical History:   Diagnosis Date    Hypothyroidism      Past Surgical History:   Procedure Laterality Date     SECTION      THYROID SURGERY       Social History     Tobacco Use    Smoking status: Never     Passive exposure: Never    Smokeless tobacco: Never   Substance Use Topics    Alcohol use: Never    Drug use: Never     Family History   Problem Relation Age of Onset    Stroke Mother      OB History    Para Term  AB Living   3 2 2   1 2   SAB IAB Ectopic Multiple Live Births           2      # Outcome Date GA Lbr Parveen/2nd Weight Sex Delivery Anes PTL Lv   3 Term 18 39w0d  3.629 kg (8 lb) F CS-LTranv Spinal N MIRNA   2 AB 17 20w0d   M SAB Spinal  FD   1 Term 08 39w0d  2.722 kg (6 lb) M CS-Unspec Spinal N MIRNA       Current Outpatient Medications:     buprenorphine-naloxone 8-2 mg (SUBOXONE) 8-2 mg, Suboxone 8 mg-2 mg sublingual film  DISSOLVE 1 FILM UNDER THE TONGUE TWICE DAILY, Disp: , Rfl:     levothyroxine (SYNTHROID) 125 MCG tablet, Take 125 mcg by mouth., Disp: , Rfl:     lisdexamfetamine (VYVANSE) 60 MG capsule, Take 60 mg by mouth., Disp: , Rfl:     ondansetron (ZOFRAN-ODT) 4 MG TbDL, Take by mouth., Disp: , Rfl:   MENARCHEAL:  Cycle Length: 5 days   Flow: normal  Dysmenorrhea: No  If yes: Mild  Intermenstrual Bleeding: No  PAP:  Last PAP: 2022    History of Abnormal PAP Smear: NO  Treated: N/A  HPV Vaccine: NO  INTERCOURSE:  Dyspareunia: No  Postcoital Bleeding: No  History of STI: No   If yes, then: No   Current  "Birth Control Method: tubal ligation  Sexually Active: yes  BREAST HISTORY:   Last Mammogram: 1 YEAR AGO  History of Abnormal Mammogram: NO  Review of Systems 12 point review of systems conducted, negative except as stated in the history of present illness. See HPI for details.  Objective:   Visit Vitals  /64   Pulse 80   Ht 5' 1" (1.549 m)   Wt 63.3 kg (139 lb 8.8 oz)   LMP 04/30/2023   BMI 26.37 kg/m²     Physical Exam:  Physical Exam  Constitutional:  General Appearance : alert, in no acute distress, normal, well nourished.  Respiratory:  Respiratory Effort: normal.  Breast:  Right: Inspection/palpation: no discharge, no masses present, no nipple retraction, no skin changes, no skin dimpling, no tenderness, no lymphadenopathy, no axillary mass, no axillary tenderness.  Left: Inspection/palpation: no discharge, no masses present, no nipple retraction, no skin changes, no skin dimpling, no tenderness, no lymphadenopathy, no axillary mass, no axillary tenderness.  Gastrointestinal:  Abdomen: no masses. no tender, nondistended.  Liver and spleen: normal  Hernias: no hernias present, no inguinal adenopathy.  Genitourinary:  External Genitalia: normal, no lesions.  Vagina: normal appearance, no abnormal discharge, no lesions.  Bladder: no mass, nontender.  Urethra: no erythema or lesions present.  Cervix: no lesions, non tender. Pap Done  Uterus: nontender, normal contour, normal mobility, normal size.   Adnexa: no masses, no tenderness.  Anus and Perineum: visually normal.   Chaperone Present    No results found for this or any previous visit (from the past 24 hour(s)).  Assessment/Plan:   Assessment:   Routine gynecological examination      No follow-ups on file. In addition to their scheduled FU, the patient has also been instructed to follow up on as needed basis  All questions were answered and the patient voiced understanding of the above issues.    "

## 2023-05-25 LAB — PSYCHE PATHOLOGY RESULT: NORMAL

## 2023-06-19 ENCOUNTER — HOSPITAL ENCOUNTER (OUTPATIENT)
Dept: RADIOLOGY | Facility: HOSPITAL | Age: 43
Discharge: HOME | End: 2023-06-19
Attending: NURSE PRACTITIONER
Payer: MEDICAID

## 2023-06-19 DIAGNOSIS — Z01.419 ROUTINE GYNECOLOGICAL EXAMINATION: ICD-10-CM

## 2023-06-19 PROCEDURE — 77067 SCR MAMMO BI INCL CAD: CPT | Mod: 26,,, | Performed by: STUDENT IN AN ORGANIZED HEALTH CARE EDUCATION/TRAINING PROGRAM

## 2023-06-19 PROCEDURE — 77063 BREAST TOMOSYNTHESIS BI: CPT | Mod: 26,,, | Performed by: STUDENT IN AN ORGANIZED HEALTH CARE EDUCATION/TRAINING PROGRAM

## 2023-06-19 PROCEDURE — 77067 SCR MAMMO BI INCL CAD: CPT | Mod: TC

## 2023-06-19 PROCEDURE — 77067 MAMMO DIGITAL SCREENING BILAT WITH TOMO: ICD-10-PCS | Mod: 26,,, | Performed by: STUDENT IN AN ORGANIZED HEALTH CARE EDUCATION/TRAINING PROGRAM

## 2023-06-19 PROCEDURE — 77063 MAMMO DIGITAL SCREENING BILAT WITH TOMO: ICD-10-PCS | Mod: 26,,, | Performed by: STUDENT IN AN ORGANIZED HEALTH CARE EDUCATION/TRAINING PROGRAM

## 2023-09-14 ENCOUNTER — HOSPITAL ENCOUNTER (EMERGENCY)
Facility: HOSPITAL | Age: 43
Discharge: HOME | End: 2023-09-14
Attending: EMERGENCY MEDICINE
Payer: MEDICAID

## 2023-09-14 VITALS
WEIGHT: 135 LBS | RESPIRATION RATE: 16 BRPM | BODY MASS INDEX: 25.49 KG/M2 | HEIGHT: 61 IN | HEART RATE: 102 BPM | TEMPERATURE: 98 F | SYSTOLIC BLOOD PRESSURE: 128 MMHG | DIASTOLIC BLOOD PRESSURE: 88 MMHG | OXYGEN SATURATION: 98 %

## 2023-09-14 DIAGNOSIS — F40.298 FEAR OF SIDE EFFECTS OF MEDICATION: Primary | ICD-10-CM

## 2023-09-14 DIAGNOSIS — R00.0 TACHYCARDIA: ICD-10-CM

## 2023-09-14 LAB
ALBUMIN SERPL-MCNC: 4.1 G/DL (ref 3.5–5)
ALBUMIN/GLOB SERPL: 1.3 RATIO (ref 1.1–2)
ALP SERPL-CCNC: 63 UNIT/L (ref 40–150)
ALT SERPL-CCNC: 10 UNIT/L (ref 0–55)
AST SERPL-CCNC: 16 UNIT/L (ref 5–34)
B-HCG FREE SERPL-ACNC: <2.42 MIU/ML
B-HCG SERPL QL: NEGATIVE
BASOPHILS # BLD AUTO: 0.05 X10(3)/MCL
BASOPHILS NFR BLD AUTO: 0.7 %
BILIRUB SERPL-MCNC: 0.3 MG/DL
BUN SERPL-MCNC: 6 MG/DL (ref 7–18.7)
CALCIUM SERPL-MCNC: 9.4 MG/DL (ref 8.4–10.2)
CHLORIDE SERPL-SCNC: 104 MMOL/L (ref 98–107)
CO2 SERPL-SCNC: 24 MMOL/L (ref 22–29)
CREAT SERPL-MCNC: 0.76 MG/DL (ref 0.55–1.02)
EOSINOPHIL # BLD AUTO: 0.06 X10(3)/MCL (ref 0–0.9)
EOSINOPHIL NFR BLD AUTO: 0.9 %
ERYTHROCYTE [DISTWIDTH] IN BLOOD BY AUTOMATED COUNT: 12 % (ref 11.5–17)
GFR SERPLBLD CREATININE-BSD FMLA CKD-EPI: >60 MLS/MIN/1.73/M2
GLOBULIN SER-MCNC: 3.1 GM/DL (ref 2.4–3.5)
GLUCOSE SERPL-MCNC: 83 MG/DL (ref 74–100)
HCT VFR BLD AUTO: 38.4 % (ref 37–47)
HGB BLD-MCNC: 13.2 G/DL (ref 12–16)
IMM GRANULOCYTES # BLD AUTO: 0.01 X10(3)/MCL (ref 0–0.04)
IMM GRANULOCYTES NFR BLD AUTO: 0.1 %
LYMPHOCYTES # BLD AUTO: 1.66 X10(3)/MCL (ref 0.6–4.6)
LYMPHOCYTES NFR BLD AUTO: 24.3 %
MAGNESIUM SERPL-MCNC: 1.9 MG/DL (ref 1.6–2.6)
MCH RBC QN AUTO: 30.7 PG (ref 27–31)
MCHC RBC AUTO-ENTMCNC: 34.4 G/DL (ref 33–36)
MCV RBC AUTO: 89.3 FL (ref 80–94)
MONOCYTES # BLD AUTO: 0.39 X10(3)/MCL (ref 0.1–1.3)
MONOCYTES NFR BLD AUTO: 5.7 %
NEUTROPHILS # BLD AUTO: 4.66 X10(3)/MCL (ref 2.1–9.2)
NEUTROPHILS NFR BLD AUTO: 68.3 %
PLATELET # BLD AUTO: 247 X10(3)/MCL (ref 130–400)
PMV BLD AUTO: 10.8 FL (ref 7.4–10.4)
POTASSIUM SERPL-SCNC: 4.4 MMOL/L (ref 3.5–5.1)
PROT SERPL-MCNC: 7.2 GM/DL (ref 6.4–8.3)
RBC # BLD AUTO: 4.3 X10(6)/MCL (ref 4.2–5.4)
SODIUM SERPL-SCNC: 138 MMOL/L (ref 136–145)
TSH SERPL-ACNC: 1.22 UIU/ML (ref 0.35–4.94)
WBC # SPEC AUTO: 6.83 X10(3)/MCL (ref 4.5–11.5)

## 2023-09-14 PROCEDURE — 96360 HYDRATION IV INFUSION INIT: CPT

## 2023-09-14 PROCEDURE — 93005 ELECTROCARDIOGRAM TRACING: CPT

## 2023-09-14 PROCEDURE — 85025 COMPLETE CBC W/AUTO DIFF WBC: CPT | Performed by: EMERGENCY MEDICINE

## 2023-09-14 PROCEDURE — 93010 ELECTROCARDIOGRAM REPORT: CPT | Mod: ,,, | Performed by: INTERNAL MEDICINE

## 2023-09-14 PROCEDURE — 83735 ASSAY OF MAGNESIUM: CPT | Performed by: EMERGENCY MEDICINE

## 2023-09-14 PROCEDURE — 80053 COMPREHEN METABOLIC PANEL: CPT | Performed by: EMERGENCY MEDICINE

## 2023-09-14 PROCEDURE — 84443 ASSAY THYROID STIM HORMONE: CPT | Performed by: EMERGENCY MEDICINE

## 2023-09-14 PROCEDURE — 84702 CHORIONIC GONADOTROPIN TEST: CPT | Performed by: EMERGENCY MEDICINE

## 2023-09-14 PROCEDURE — 99284 EMERGENCY DEPT VISIT MOD MDM: CPT | Mod: 25

## 2023-09-14 PROCEDURE — 63600175 PHARM REV CODE 636 W HCPCS: Performed by: EMERGENCY MEDICINE

## 2023-09-14 PROCEDURE — 93010 EKG 12-LEAD: ICD-10-PCS | Mod: ,,, | Performed by: INTERNAL MEDICINE

## 2023-09-14 PROCEDURE — 81025 URINE PREGNANCY TEST: CPT | Performed by: EMERGENCY MEDICINE

## 2023-09-14 RX ADMIN — SODIUM CHLORIDE, POTASSIUM CHLORIDE, SODIUM LACTATE AND CALCIUM CHLORIDE 1000 ML: 600; 310; 30; 20 INJECTION, SOLUTION INTRAVENOUS at 03:09

## 2023-09-14 NOTE — ED PROVIDER NOTES
Encounter Date: 2023       History     Chief Complaint   Patient presents with    Altered Mental Status     Had a loss of time today    last remembers being on the couch on the phone     woke sometime later confused     phone was in bedroom   cannot explain how phone moved   does not remember events between   denies any injury or pain   hx seizures   pt added after triage that she is quitting vaping in the last week and took an extra wellbutrin this morning     Patient presents to emergency department complaining of waking up feeling extremely confused in loss.  She believes it is because she took 2 of her Wellbutrin.  She says she is prescribed tube Wellbutrin twice a day but she normally just takes 1 this morning she decided to take 2 she wants to try to quit vaping all together.  She said she took that around 6:00 a.m. next thing she knew she woke up about 30 minutes prior to arrival here.  Very confused with her phone in a different place does not have any idea what went on today.  She did not urinate on herself she did not bite her tongue.  She has had a distant seizure long time ago.  She does not take any antiepileptic medications she does take the Wellbutrin the fine Ace and thyroid supplement every day.  She takes Suboxone every day.  She has a specialist named [facility] follows her for the Suboxone and Vyvanse, Synthroid therapy she has a another primary care doctor in town Dr. Simmons    Patient does not smoke tobacco but does vape.  No alcohol no drugs.  Patient has not had COVID vaccines no pneumonia no flu no tetanus shots.      Has a history of thyroid surgery  x2 tubal ligation.  And tonsillectomy adenoidectomy.   3 para 2 SAB 1.  Last menstrual cycle about the .  She is single has a fiancee they do not live together she is not currently employed   Mom had a stroke and  of complications of the stroke  Dad is alive no known medical issues  Patient has allergies to  codeine only       Review of patient's allergies indicates:   Allergen Reactions    Codeine Rash     Past Medical History:   Diagnosis Date    Hypothyroidism      Past Surgical History:   Procedure Laterality Date     SECTION      THYROID SURGERY      TUBAL LIGATION       Family History   Problem Relation Age of Onset    Stroke Mother      Social History     Tobacco Use    Smoking status: Never     Passive exposure: Never    Smokeless tobacco: Never   Substance Use Topics    Alcohol use: Never    Drug use: Never     Review of Systems   Constitutional:  Positive for fatigue.   HENT: Negative.     Eyes: Negative.    Respiratory: Negative.     Cardiovascular: Negative.    Gastrointestinal: Negative.    Endocrine: Negative.    Genitourinary: Negative.    Musculoskeletal: Negative.    Skin: Negative.    Allergic/Immunologic: Negative.    Neurological: Negative.  Negative for dizziness, tremors, seizures, syncope, facial asymmetry, speech difficulty, weakness, light-headedness, numbness and headaches.   Hematological: Negative.    Psychiatric/Behavioral:  Positive for confusion.    All other systems reviewed and are negative.      Physical Exam     Initial Vitals [23 1434]   BP Pulse Resp Temp SpO2   130/84 (!) 122 16 98.3 °F (36.8 °C) 98 %      MAP       --         Physical Exam    Nursing note and vitals reviewed.  Constitutional: She appears well-developed and well-nourished.   Pleasant lady fully awake oriented GCS 15  She has a negative pronator drift test her finger-to-nose is brisk bilaterally.  Heel-to-shin is normal deep tendon reflexes patella and biceps are equal and symmetrical 2+ upper and lower extremity strength 5/5 bilaterally face is symmetrical speech is clear and goal oriented   HENT:   Head: Normocephalic and atraumatic.   Right Ear: Tympanic membrane and external ear normal.   Left Ear: Tympanic membrane and external ear normal.   Nose: Nose normal.   Mouth/Throat: Oropharynx is clear  and moist and mucous membranes are normal.   Eyes: EOM are normal. Pupils are equal, round, and reactive to light.   Neck: Neck supple. No thyromegaly present. No tracheal deviation present. No JVD present.   Normal range of motion.  Cardiovascular:  Normal rate, regular rhythm and normal heart sounds.     Exam reveals no gallop and no friction rub.       No murmur heard.  Pulmonary/Chest: Breath sounds normal. No stridor. No respiratory distress. She has no wheezes. She has no rhonchi. She has no rales. She exhibits no tenderness.   Abdominal: Abdomen is soft. Bowel sounds are normal. She exhibits no distension and no mass. There is no abdominal tenderness.   Musculoskeletal:         General: No tenderness or edema. Normal range of motion.      Cervical back: Normal range of motion and neck supple.     Lymphadenopathy:     She has no cervical adenopathy.   Neurological: She is alert and oriented to person, place, and time. She has normal strength and normal reflexes. She displays normal reflexes. No sensory deficit. GCS score is 15. GCS eye subscore is 4. GCS verbal subscore is 5. GCS motor subscore is 6.   Skin: Skin is warm and dry. Capillary refill takes less than 2 seconds. No rash noted.   Psychiatric: She has a normal mood and affect. Her behavior is normal. Judgment and thought content normal.   Fiance at the bedside said she does appear to be slightly tired but does not notice any other         ED Course   Procedures  Labs Reviewed   COMPREHENSIVE METABOLIC PANEL - Abnormal; Notable for the following components:       Result Value    Blood Urea Nitrogen 6.0 (*)     All other components within normal limits   CBC WITH DIFFERENTIAL - Abnormal; Notable for the following components:    MPV 10.8 (*)     All other components within normal limits   HCG, QUANTITATIVE - Normal   MAGNESIUM - Normal   TSH - Normal   PREGNANCY TEST, URINE RAPID - Normal   CBC W/ AUTO DIFFERENTIAL    Narrative:     The following orders  were created for panel order CBC auto differential.  Procedure                               Abnormality         Status                     ---------                               -----------         ------                     CBC with Differential[[id number]]       Abnormal            Final result                 Please view results for these tests on the individual orders.     EKG Readings: (Independently Interpreted)   Initial Reading: No STEMI. Previous EKG Date: No old EKGs in system. Rhythm: Sinus Tachycardia. Heart Rate: 108. Ectopy: No Ectopy. Conduction: Normal.   Sinus tachycardia at 108 per otherwise normal ECG       Imaging Results    None          Medications   lactated ringers bolus 1,000 mL (0 mLs Intravenous Stopped 9/14/23 0475)     Medical Decision Making  Took 2 Wellbutrin this morning instead of her normal 1 woke up about 30 minutes ago very confused did not realize the phone was a different place has no idea what happened all day was not incontinent of urine did not bite her tongue has no pain anywhere headaches just feels very tired and confused about what happened today she believes is due to the double dose of Wellbutrin to took this morning she said is prescribed like that but she took her takes only 1 at a day morning and night.  Usually not too at 1 time  She is also on Suboxone and Vyvanse and her Synthroid medications.  Thyroid supplement    Pleasant lady awake oriented denture plate noted on top normocephalic atraumatic pupils round react to light extraocular motions intact no facial asymmetry noted no signs of head trauma neck is soft supple without nuchal rigidity or meningeal signs heart is regular rate and rhythm minimum tachycardia 110 per when I examined her lungs are clear to auscultation and percussion abdomen is benign no CVA tenderness in the back no midline tenderness no stiff neck no pronator drift 5/5  strength 5/5 lower extremity strength distal neurovascular exam is  intact neurological seems completely appropriate overall she just feel feels tired she states heel-to-shin is intact normal speech facial features are all symmetrical GCS is 15    Amount and/or Complexity of Data Reviewed  Labs: ordered.     Details: CBC returned normal chemistries TSH pregnancy test all returned benign.  ECG/medicine tests: ordered.     Details: Sinus tachycardia otherwise benign  per  Discussion of management or test interpretation with external provider(s): MDM  Problems addressed  Co-morbidities and/or factors adding to the complexity or risk for the patient:  Multiple medications Suboxone Vyvanse Wellbutrin Synthroid  Problems addressed:  Took 2 Wellbutrin this morning woke up lost the day today does not know what happened normally just takes 1 Wellbutrin at a time   Acute problem/illness or progression/exacerbation of chronic problem with potential threat to life/bodily dysfunction?:  None known  Differential diagnoses/problems considered: see above     Amount and/or Complexity of Data Reviewed  Independent Historian: none (see above for summary)  External Data Reviewed: notes from previous ED visits (see above for summary)  Risk and benefits of testing: discussed   Labs: Labs: ordered and reviewed  Radiology:Radiology: ordered and independent interpretation performed (see above or ED course)  ECG/medicine tests:Radiology: ordered and independent interpretation performed (see above or ED course)  none    Risk  Diagnosis or treatment significantly impacted by social determinants of health: none   Shared decision making     Critical Care  none     Risk  Risk Details: Differential diagnosis include global amnesia transient, seizure event, hypoglycemic events self repaired, illicit drug use, side effects of medications taken in higher dose     side effects of medications most likely cause for today's acute confusional event    Critical Care  Total time providing critical care: 0  minutes               ED Course as of 09/14/23 1611   Thu Sep 14, 2023   1517 I did ask the patient about working this up I told her she was slightly tachycardic and workup could be done to try to find a reason for this.  I told her we would give her some fluids check her EKG and basic lab work she asked about her thyroid TSH will be done she has no focal neurological deficits I see no reason for imaging I tend to believe like she does that this is due to the higher dose of Wellbutrin today [DM]   1607 Patient's lab work has returned quite benign.  I talked to her she said she is feeling better heart rate is now 98 she is only got 400 mL fluid absorbed I think when she gets the rest of the fluid her heart rate made come down slightly more about do think it is safe for her to go home we talked about her taking only 1 of the Wellbutrin tablets at a time not to [DM]      ED Course User Index  [DM] Link Zelaya MD                    Clinical Impression:   Final diagnoses:  [R00.0] Tachycardia  [F40.298] Fear of side effects of medication (Primary)        ED Disposition Condition    Discharge Stable          ED Prescriptions    None       Follow-up Information       Follow up With Specialties Details Why Contact Info    Kavon Simmons MD Family Medicine   [address] [place] [address]  [phone]               Link Zelaya MD  09/14/23 1611

## 2023-09-19 LAB — POCT GLUCOSE: 91 MG/DL (ref 70–110)

## 2023-11-17 ENCOUNTER — HOSPITAL ENCOUNTER (EMERGENCY)
Facility: HOSPITAL | Age: 43
Discharge: HOME | End: 2023-11-17
Attending: FAMILY MEDICINE
Payer: MEDICAID

## 2023-11-17 VITALS
TEMPERATURE: 99 F | DIASTOLIC BLOOD PRESSURE: 82 MMHG | BODY MASS INDEX: 24.55 KG/M2 | HEIGHT: 61 IN | RESPIRATION RATE: 18 BRPM | SYSTOLIC BLOOD PRESSURE: 116 MMHG | HEART RATE: 117 BPM | OXYGEN SATURATION: 100 % | WEIGHT: 130 LBS

## 2023-11-17 DIAGNOSIS — R41.0 CONFUSION: ICD-10-CM

## 2023-11-17 DIAGNOSIS — R56.9 SEIZURE: Primary | ICD-10-CM

## 2023-11-17 LAB
ALBUMIN SERPL-MCNC: 4.2 G/DL (ref 3.5–5)
ALBUMIN/GLOB SERPL: 1.3 RATIO (ref 1.1–2)
ALP SERPL-CCNC: 70 UNIT/L (ref 40–150)
ALT SERPL-CCNC: 8 UNIT/L (ref 0–55)
AST SERPL-CCNC: 19 UNIT/L (ref 5–34)
BASOPHILS # BLD AUTO: 0.05 X10(3)/MCL
BASOPHILS NFR BLD AUTO: 0.6 %
BILIRUB SERPL-MCNC: 0.6 MG/DL
BUN SERPL-MCNC: 10 MG/DL (ref 7–18.7)
CALCIUM SERPL-MCNC: 9.6 MG/DL (ref 8.4–10.2)
CHLORIDE SERPL-SCNC: 102 MMOL/L (ref 98–107)
CO2 SERPL-SCNC: 25 MMOL/L (ref 22–29)
CREAT SERPL-MCNC: 0.7 MG/DL (ref 0.55–1.02)
EOSINOPHIL # BLD AUTO: 0.11 X10(3)/MCL (ref 0–0.9)
EOSINOPHIL NFR BLD AUTO: 1.4 %
ERYTHROCYTE [DISTWIDTH] IN BLOOD BY AUTOMATED COUNT: 11.8 % (ref 11.5–17)
GFR SERPLBLD CREATININE-BSD FMLA CKD-EPI: >60 MLS/MIN/1.73/M2
GLOBULIN SER-MCNC: 3.2 GM/DL (ref 2.4–3.5)
GLUCOSE SERPL-MCNC: 84 MG/DL (ref 74–100)
HCT VFR BLD AUTO: 39.4 % (ref 37–47)
HGB BLD-MCNC: 13.6 G/DL (ref 12–16)
IMM GRANULOCYTES # BLD AUTO: 0.01 X10(3)/MCL (ref 0–0.04)
IMM GRANULOCYTES NFR BLD AUTO: 0.1 %
LYMPHOCYTES # BLD AUTO: 1.85 X10(3)/MCL (ref 0.6–4.6)
LYMPHOCYTES NFR BLD AUTO: 22.8 %
MCH RBC QN AUTO: 31 PG (ref 27–31)
MCHC RBC AUTO-ENTMCNC: 34.5 G/DL (ref 33–36)
MCV RBC AUTO: 89.7 FL (ref 80–94)
MONOCYTES # BLD AUTO: 0.57 X10(3)/MCL (ref 0.1–1.3)
MONOCYTES NFR BLD AUTO: 7 %
NEUTROPHILS # BLD AUTO: 5.53 X10(3)/MCL (ref 2.1–9.2)
NEUTROPHILS NFR BLD AUTO: 68.1 %
PLATELET # BLD AUTO: 249 X10(3)/MCL (ref 130–400)
PMV BLD AUTO: 10.3 FL (ref 7.4–10.4)
POTASSIUM SERPL-SCNC: 4.3 MMOL/L (ref 3.5–5.1)
PROT SERPL-MCNC: 7.4 GM/DL (ref 6.4–8.3)
RBC # BLD AUTO: 4.39 X10(6)/MCL (ref 4.2–5.4)
SODIUM SERPL-SCNC: 135 MMOL/L (ref 136–145)
WBC # SPEC AUTO: 8.12 X10(3)/MCL (ref 4.5–11.5)

## 2023-11-17 PROCEDURE — 85025 COMPLETE CBC W/AUTO DIFF WBC: CPT | Performed by: FAMILY MEDICINE

## 2023-11-17 PROCEDURE — 96374 THER/PROPH/DIAG INJ IV PUSH: CPT

## 2023-11-17 PROCEDURE — 99285 EMERGENCY DEPT VISIT HI MDM: CPT | Mod: 25

## 2023-11-17 PROCEDURE — 80053 COMPREHEN METABOLIC PANEL: CPT | Performed by: FAMILY MEDICINE

## 2023-11-17 PROCEDURE — 63600175 PHARM REV CODE 636 W HCPCS: Performed by: FAMILY MEDICINE

## 2023-11-17 RX ORDER — LEVETIRACETAM 500 MG/5ML
1000 INJECTION, SOLUTION, CONCENTRATE INTRAVENOUS
Status: COMPLETED | OUTPATIENT
Start: 2023-11-17 | End: 2023-11-17

## 2023-11-17 RX ORDER — LEVETIRACETAM 500 MG/1
500 TABLET ORAL 2 TIMES DAILY
Qty: 60 TABLET | Refills: 11 | Status: SHIPPED | OUTPATIENT
Start: 2023-11-17 | End: 2024-11-16

## 2023-11-17 RX ADMIN — LEVETIRACETAM 1000 MG: 100 INJECTION, SOLUTION INTRAVENOUS at 09:11

## 2023-11-17 NOTE — ED PROVIDER NOTES
Encounter Date: 2023       History     Chief Complaint   Patient presents with    Weakness     C/o feeling weird this morning and felt like she may have had a seizure but does not know. Reports feeling weak     Patient presents today by EMS, reports she was sitting in her chair at home, and then the next thing she realized she felt tired, confused, and is concerned she had a seizure.  Patient has a history of seizure disorder, does not know what medication she used to take for it.  Reports her last seizure was approximately 2018 or 2019.  Does not have a neurologist.  Patient was seen here previously for similar episode, after taking extra dose Wellbutrin.  However patient denies that she took extra dose Wellbutrin today.  Patient states she feels as if she did have a seizure.  No head injury.  No fever.  No chest pain or shortness of breath.        Review of patient's allergies indicates:   Allergen Reactions    Codeine Rash     Past Medical History:   Diagnosis Date    Hypothyroidism      Past Surgical History:   Procedure Laterality Date     SECTION      THYROID SURGERY      TUBAL LIGATION       Family History   Problem Relation Age of Onset    Stroke Mother      Social History     Tobacco Use    Smoking status: Former     Types: Cigarettes, Vaping with nicotine     Passive exposure: Never    Smokeless tobacco: Never   Substance Use Topics    Alcohol use: Never    Drug use: Not Currently     Review of Systems   All other systems reviewed and are negative.      Physical Exam     Initial Vitals [23 0914]   BP Pulse Resp Temp SpO2   126/82 (!) 126 18 98.8 °F (37.1 °C) 97 %      MAP       --         Physical Exam    Nursing note and vitals reviewed.  Constitutional: She appears well-developed and well-nourished.   HENT:   Head: Normocephalic and atraumatic.   Neck: Neck supple.   Cardiovascular:  Normal rate and regular rhythm.           Pulmonary/Chest: Breath sounds normal.   Abdominal: Abdomen  is soft.   Musculoskeletal:      Cervical back: Neck supple.     Neurological: She is alert and oriented to person, place, and time.   Skin: Skin is warm.   Psychiatric: She has a normal mood and affect. Thought content normal.         ED Course   Procedures  Labs Reviewed   COMPREHENSIVE METABOLIC PANEL - Abnormal; Notable for the following components:       Result Value    Sodium Level 135 (*)     All other components within normal limits   CBC W/ AUTO DIFFERENTIAL    Narrative:     The following orders were created for panel order CBC auto differential.  Procedure                               Abnormality         Status                     ---------                               -----------         ------                     CBC with Differential[[id number]]                           Final result                 Please view results for these tests on the individual orders.   CBC WITH DIFFERENTIAL          Imaging Results              CT Head Without Contrast (Final result)  Result time 11/17/23 09:40:37      Final result by Catherine Chu MD (11/17/23 09:40:37)                   Impression:      No appreciable acute intracranial abnormality.      Electronically signed by: Catherine Chu  Date:    11/17/2023  Time:    09:40               Narrative:    EXAMINATION:  CT HEAD WITHOUT CONTRAST    CLINICAL HISTORY:  Seizure, new-onset, no history of trauma;    TECHNIQUE:  Low dose axial CT images obtained throughout the head without intravenous contrast.  Axial, sagittal and coronal reconstructions were performed and interpreted.    DLP: 842 mGycm    All CT scans at this location are performed using dose optimization techniques as appropriate to a performed exam including the following automated exposure control, adjustment of the mA and/or kV according to patient size and/or use of iterative reconstruction technique    COMPARISON:  No relevant prior available for comparison.    FINDINGS:  BRAIN: Gray white  differentiation is maintained. White matter is within normal limits for age.  No hemorrhage. No edema. No mass effect or midline shift.  The posterior fossa and midline structures are unremarkable.    VENTRICLES: Normal in size and configuration.    EXTRA-AXIAL: No abnormal extra-axial collections.    BONES: Calvarium is intact.    SINUSES AND MASTOIDS: Visualized paranasal sinuses and mastoid air cells are clear.                                       Medications   levETIRAcetam injection 1,000 mg (1,000 mg Intravenous Given 11/17/23 4052)     Medical Decision Making  It is unclear if this is a psychiatric issue, side effects of medication, or a seizure.  We will get CT scan of head to rule out intracranial pathology.  We will get basic labs.  We will check for infection.  We will give dose of Keppra and then start Keppra at home.  And also referred to Neurology because patient needs to see Neurology to see if she should continue her seizure medication.    Amount and/or Complexity of Data Reviewed  Labs: ordered.  Radiology: ordered.    Risk  Prescription drug management.                                   Clinical Impression:  Final diagnoses:  [R56.9] Seizure (Primary)  [R41.0] Confusion          ED Disposition Condition    Discharge Stable          ED Prescriptions       Medication Sig Dispense Start Date End Date Auth. Provider    levETIRAcetam (KEPPRA) 500 MG Tab Take 1 tablet (500 mg total) by mouth 2 (two) times daily. 60 tablet 11/17/2023 11/16/2024 Vlad Rojas Jr., MD          Follow-up Information       Follow up With Specialties Details Why Contact Info    Kavon Simmons MD Family Medicine   [address] [facility]   [place] [address]  [phone]               Vlad Rojas Jr., MD  11/17/23 4080

## 2024-03-18 NOTE — DISCHARGE INSTRUCTIONS
Never take more than you Wellbutrin at 1 time.  Follow-up with your doctor that prescribed these medications please    Continue your other medications contact the   That prescribes these medications and check with her please  
weight-bearing as tolerated

## 2024-06-04 ENCOUNTER — OFFICE VISIT (OUTPATIENT)
Dept: OBSTETRICS AND GYNECOLOGY | Facility: CLINIC | Age: 44
End: 2024-06-04
Payer: MEDICAID

## 2024-06-04 VITALS
BODY MASS INDEX: 27.13 KG/M2 | HEIGHT: 61 IN | HEART RATE: 109 BPM | SYSTOLIC BLOOD PRESSURE: 130 MMHG | DIASTOLIC BLOOD PRESSURE: 84 MMHG | WEIGHT: 143.69 LBS

## 2024-06-04 DIAGNOSIS — Z01.419 ROUTINE GYNECOLOGICAL EXAMINATION: ICD-10-CM

## 2024-06-04 DIAGNOSIS — Z11.3 SCREEN FOR SEXUALLY TRANSMITTED DISEASES: ICD-10-CM

## 2024-06-04 DIAGNOSIS — Z12.39 ENCOUNTER FOR SCREENING FOR MALIGNANT NEOPLASM OF BREAST, UNSPECIFIED SCREENING MODALITY: Primary | ICD-10-CM

## 2024-06-04 PROCEDURE — 99396 PREV VISIT EST AGE 40-64: CPT | Mod: ,,, | Performed by: NURSE PRACTITIONER

## 2024-06-04 PROCEDURE — 3075F SYST BP GE 130 - 139MM HG: CPT | Mod: CPTII,,, | Performed by: NURSE PRACTITIONER

## 2024-06-04 PROCEDURE — 87661 TRICHOMONAS VAGINALIS AMPLIF: CPT | Performed by: NURSE PRACTITIONER

## 2024-06-04 PROCEDURE — 3008F BODY MASS INDEX DOCD: CPT | Mod: CPTII,,, | Performed by: NURSE PRACTITIONER

## 2024-06-04 PROCEDURE — 1160F RVW MEDS BY RX/DR IN RCRD: CPT | Mod: CPTII,,, | Performed by: NURSE PRACTITIONER

## 2024-06-04 PROCEDURE — 87491 CHLMYD TRACH DNA AMP PROBE: CPT | Performed by: NURSE PRACTITIONER

## 2024-06-04 PROCEDURE — 3079F DIAST BP 80-89 MM HG: CPT | Mod: CPTII,,, | Performed by: NURSE PRACTITIONER

## 2024-06-04 PROCEDURE — 1159F MED LIST DOCD IN RCRD: CPT | Mod: CPTII,,, | Performed by: NURSE PRACTITIONER

## 2024-06-04 PROCEDURE — 87591 N.GONORRHOEAE DNA AMP PROB: CPT | Performed by: NURSE PRACTITIONER

## 2024-06-04 RX ORDER — LEVOTHYROXINE SODIUM 150 UG/1
150 TABLET ORAL
COMMUNITY
Start: 2024-05-17

## 2024-06-04 RX ORDER — LISDEXAMFETAMINE DIMESYLATE 70 MG/1
70 CAPSULE ORAL EVERY MORNING
COMMUNITY
Start: 2024-05-22

## 2024-06-04 RX ORDER — ASPIRIN 325 MG
50000 TABLET, DELAYED RELEASE (ENTERIC COATED) ORAL
COMMUNITY
Start: 2024-03-16

## 2024-06-04 RX ORDER — NAPROXEN 500 MG/1
500 TABLET ORAL 2 TIMES DAILY
COMMUNITY
Start: 2024-01-26

## 2024-06-04 NOTE — PROGRESS NOTES
Patient ID: [id number]   Chief Complaint: Annual exam  Chief Complaint   Patient presents with    Annual Exam     NO C/O'S.     HPI:   Alexa Garcia is a 43 y.o. year old  here for her Annual Exam.   Patient's last menstrual period was 2024 (exact date).   She is doing well. Denies any health changes.   Annual Exam (NO C/O'S.)    Subjective:     Past Medical History:   Diagnosis Date    ADD (attention deficit disorder)     History of narcotic addiction     Hypothyroidism     Seizures     Sickle cell anemia     Vitamin D deficiency      Past Surgical History:   Procedure Laterality Date     SECTION      THYROID SURGERY      TUBAL LIGATION       Social History     Tobacco Use    Smoking status: Every Day     Types: Vaping with nicotine     Passive exposure: Never    Smokeless tobacco: Never   Substance Use Topics    Alcohol use: Never    Drug use: Not Currently     Family History   Problem Relation Name Age of Onset    Stroke Mother       OB History    Para Term  AB Living   3 2 2   1 2   SAB IAB Ectopic Multiple Live Births           2      # Outcome Date GA Lbr Parveen/2nd Weight Sex Type Anes PTL Lv   3 Term 18 39w0d  3.629 kg (8 lb) F CS-LTranv Spinal N MIRNA   2 AB 17 20w0d   M SAB Spinal  FD   1 Term 08 39w0d  2.722 kg (6 lb) M CS-Unspec Spinal N MIRNA       Current Outpatient Medications:     buprenorphine-naloxone 8-2 mg (SUBOXONE) 8-2 mg, Suboxone 8 mg-2 mg sublingual film  DISSOLVE 1 FILM UNDER THE TONGUE TWICE DAILY, Disp: , Rfl:     cholecalciferol, vitamin D3, 1,250 mcg (50,000 unit) capsule, Take 50,000 Units by mouth every 7 days., Disp: , Rfl:     levothyroxine (SYNTHROID) 150 MCG tablet, Take 150 mcg by mouth., Disp: , Rfl:     naproxen (NAPROSYN) 500 MG tablet, Take 500 mg by mouth 2 (two) times daily., Disp: , Rfl:     VYVANSE 70 mg capsule, Take 70 mg by mouth every morning., Disp: , Rfl:   MENARCHEAL:  Cycle Length: 5 DAYS  Flow:  "normal  Dysmenorrhea: Yes  If yes: [place]eraye  Intermenstrual Bleeding: No  PAP:  Last PAP: 6/7/2024    History of Abnormal PAP Smear: NO  HPV Vaccine:UNKNOWN  INTERCOURSE:  Dyspareunia: No  Postcoital Bleeding: No  History of STI: No  Current Birth Control Method: none  Sexually Active: yes  BREAST HISTORY:   Last Mammogram: 6-19-23  History of Abnormal Mammogram: NO  COLONOSCOPY:  Last Colonoscopy:  N/A           Review of Systems 12 point review of systems conducted, negative except as stated in the history of present illness.     See HPI for details.  Objective:   Visit Vitals  /84   Pulse 109   Ht 5' 1" (1.549 m)   Wt 65.2 kg (143 lb 11.2 oz)   LMP 05/11/2024 (Exact Date)   BMI 27.15 kg/m²     No results found for this or any previous visit (from the past 24 hour(s)).  Physical Exam:  Chaperone present for exam.  Physical Exam  Constitutional:  General Appearance : alert, in no acute distress, normal, well nourished.  Cardiovascular:   Regular rate and rhythm.  Respiratory:  Respiratory Effort: normal.  Breast:  Right: Inspection/palpation: no discharge, no masses present, no nipple retraction, no skin changes, no skin dimpling, no tenderness, no lymphadenopathy, no axillary mass, no axillary tenderness.  Left: Inspection/palpation: no discharge, no masses present, no nipple retraction, no skin changes, no skin dimpling, no tenderness, no lymphadenopathy, no axillary mass, no axillary tenderness.  Gastrointestinal:  Abdomen: no masses. no tender, nondistended.  Genitourinary:  External Genitalia: normal, no lesions.  Vagina: normal appearance, no abnormal discharge, no lesions.  Bladder: no mass, nontender.  Urethra: no erythema or lesions present.  Cervix: no lesions, non tender.   Uterus: nontender, normal contour, normal mobility, normal size.   Adnexa: no masses, no tenderness.  Anus and Perineum: visually normal.     No results found for this or any previous visit (from the past 24 " hour(s)).  Assessment/Plan:   Assessment:   Encounter for screening for malignant neoplasm of breast, unspecified screening modality  -     Mammo Digital Screening Bilat; Future; Expected date: 06/17/2024    Routine gynecological examination  -     Liquid-Based Pap Smear, Screening  -     Chlamydia trachomatis  -     Neisseria gonorrhoeae  -     Trichomonas vaginalis    Screen for sexually transmitted diseases  -     Liquid-Based Pap Smear, Screening  -     Chlamydia trachomatis  -     Neisseria gonorrhoeae  -     Trichomonas vaginalis      No follow-ups on file.     In addition to their scheduled follow-up, the patient has also been instructed to follow up on as needed basis.   All questions were answered and the patient voiced understanding of the above issues.

## 2024-06-07 LAB
CHLAMYDIA TRACHOMATIS: NEGATIVE
NEISSERIA GONORRHOEAE: NEGATIVE
PSYCHE PATHOLOGY RESULT: NORMAL
TRICHOMONAS VAGINALIS: NEGATIVE

## 2024-07-01 ENCOUNTER — HOSPITAL ENCOUNTER (OUTPATIENT)
Dept: RADIOLOGY | Facility: HOSPITAL | Age: 44
Discharge: HOME | End: 2024-07-01
Attending: NURSE PRACTITIONER
Payer: MEDICAID

## 2024-07-01 DIAGNOSIS — Z12.39 ENCOUNTER FOR SCREENING FOR MALIGNANT NEOPLASM OF BREAST, UNSPECIFIED SCREENING MODALITY: ICD-10-CM

## 2024-07-01 PROCEDURE — 77067 SCR MAMMO BI INCL CAD: CPT | Mod: 26,,, | Performed by: STUDENT IN AN ORGANIZED HEALTH CARE EDUCATION/TRAINING PROGRAM

## 2024-07-01 PROCEDURE — 77067 SCR MAMMO BI INCL CAD: CPT | Mod: TC

## 2024-07-01 PROCEDURE — 77063 BREAST TOMOSYNTHESIS BI: CPT | Mod: 26,,, | Performed by: STUDENT IN AN ORGANIZED HEALTH CARE EDUCATION/TRAINING PROGRAM

## 2024-08-17 ENCOUNTER — HOSPITAL ENCOUNTER (EMERGENCY)
Facility: HOSPITAL | Age: 44
Discharge: HOME | End: 2024-08-17
Attending: EMERGENCY MEDICINE
Payer: MEDICAID

## 2024-08-17 VITALS
OXYGEN SATURATION: 99 % | BODY MASS INDEX: 25.69 KG/M2 | DIASTOLIC BLOOD PRESSURE: 75 MMHG | HEIGHT: 63 IN | HEART RATE: 99 BPM | RESPIRATION RATE: 19 BRPM | TEMPERATURE: 98 F | WEIGHT: 145 LBS | SYSTOLIC BLOOD PRESSURE: 128 MMHG

## 2024-08-17 DIAGNOSIS — R21 RASH: Primary | ICD-10-CM

## 2024-08-17 DIAGNOSIS — L23.9 ALLERGIC DERMATITIS: ICD-10-CM

## 2024-08-17 PROCEDURE — 96372 THER/PROPH/DIAG INJ SC/IM: CPT

## 2024-08-17 PROCEDURE — 63600175 PHARM REV CODE 636 W HCPCS

## 2024-08-17 PROCEDURE — 99284 EMERGENCY DEPT VISIT MOD MDM: CPT | Mod: 25

## 2024-08-17 RX ORDER — DEXAMETHASONE SODIUM PHOSPHATE 4 MG/ML
8 INJECTION, SOLUTION INTRA-ARTICULAR; INTRALESIONAL; INTRAMUSCULAR; INTRAVENOUS; SOFT TISSUE
Status: COMPLETED | OUTPATIENT
Start: 2024-08-17 | End: 2024-08-17

## 2024-08-17 RX ADMIN — DEXAMETHASONE SODIUM PHOSPHATE 8 MG: 4 INJECTION, SOLUTION INTRA-ARTICULAR; INTRALESIONAL; INTRAMUSCULAR; INTRAVENOUS; SOFT TISSUE at 04:08

## 2024-08-17 NOTE — DISCHARGE INSTRUCTIONS
Patient educated to take otc zyrtec or cetirizine daily. The patient educated to avoid foods that are not cooked at home. The patient educated to return to ED with any difficulty breathing or worsening of rash.

## 2024-08-17 NOTE — ED PROVIDER NOTES
Encounter Date: 2024       History     Chief Complaint   Patient presents with    Rash     C/o itching and bumps to anterior thighs starting yesterday     See mdm    The history is provided by the patient.     Review of patient's allergies indicates:   Allergen Reactions    Codeine Rash     Past Medical History:   Diagnosis Date    ADD (attention deficit disorder)     History of narcotic addiction     Hypothyroidism     Seizures     Sickle cell anemia     Vitamin D deficiency      Past Surgical History:   Procedure Laterality Date     SECTION      THYROID SURGERY      TUBAL LIGATION       Family History   Problem Relation Name Age of Onset    Stroke Mother       Social History     Tobacco Use    Smoking status: Every Day     Types: Vaping with nicotine     Passive exposure: Never    Smokeless tobacco: Never   Substance Use Topics    Alcohol use: Never    Drug use: Not Currently     Review of Systems   Skin:  Positive for rash.   All other systems reviewed and are negative.      Physical Exam     Initial Vitals [24 1610]   BP Pulse Resp Temp SpO2   138/85 102 18 98.4 °F (36.9 °C) 99 %      MAP       --         Physical Exam    Nursing note and vitals reviewed.  Constitutional: She appears well-developed.   HENT:   Head: Normocephalic and atraumatic.   Eyes: Conjunctivae and EOM are normal. Pupils are equal, round, and reactive to light.   Neck:   Normal range of motion.  Cardiovascular:  Normal rate.           Pulmonary/Chest: No respiratory distress. She has no wheezes. She has no rhonchi. She has no rales. She exhibits no tenderness.   Abdominal: Abdomen is soft. Bowel sounds are normal.   Musculoskeletal:         General: Normal range of motion.      Cervical back: Normal range of motion.     Neurological: She is oriented to person, place, and time. GCS score is 15. GCS eye subscore is 4. GCS verbal subscore is 5. GCS motor subscore is 6.   Skin: Skin is warm. Capillary refill takes less than 2  seconds. Rash noted. Rash is macular and papular.              ED Course   Procedures  Labs Reviewed - No data to display       Imaging Results    None          Medications   dexAMETHasone injection 8 mg (8 mg Intramuscular Given 8/17/24 1620)     Medical Decision Making  43 year old female presents to the ED complaining of rash to legs and back. The patient states that rash symptoms started on last night with itching and redness. The patient states that she did take benadryl for symptoms and does admit to medication help relieve itching. The patient did show me a picture of rash to upper legs on last night and rash does appear to be better after benadryl administration. The patient does admit to going to son's award ceremony on last night. The patient states that ceremony was inside and does admit to wearing long sleeves. The patient states that she did eat jambalaya while at ceremony. The patient denies any change in soaps or lotions. The patient denies any hx of food allergies. The patient denies any environmental allergies or taking daily allergy meds. The patient denies any hx of asthma.    Risk  Prescription drug management.      Additional MDM:   Differential Diagnosis:   Other: The following diagnoses were also considered and will be evaluated: Erythema Migrans, Anaphylaxis and Eczema.            ED Course as of 08/17/24 1635   Sat Aug 17, 2024   1629 Patient evaluated. Rash is minimal to bilateral upper legs and general back area. Compared to picture on phone, patient rash has significantly improved with benadryl admin. The patient will be treated with IM dexamethasone. Patient vital signs stable. Patient rash does not appear infected, patient denies any hx of wooded areas, patient denies any sore throat, or pain to rash. Rash not noted to palms, soles of feet, anterior trunk, face, or upper extremities. Patient educated to take otc zyrtec or cetirizine daily. The patient educated to avoid foods that are  not cooked at home. The patient educated to return to ED with any difficulty breathing or worsening of rash.  [DL]      ED Course User Index  [DL] Richie Brito NP                           Clinical Impression:  Final diagnoses:  [R21] Rash (Primary)  [L23.9] Allergic dermatitis          ED Disposition Condition    Discharge Stable          ED Prescriptions    None       Follow-up Information       Follow up With Specialties Details Why Contact Info    Kavon Simmons MD Family Medicine In 3 days If symptoms worsen [address]  [place] [address]  [phone]               Richie Brito NP  08/17/24 0993